# Patient Record
Sex: FEMALE | Race: WHITE | URBAN - METROPOLITAN AREA
[De-identification: names, ages, dates, MRNs, and addresses within clinical notes are randomized per-mention and may not be internally consistent; named-entity substitution may affect disease eponyms.]

---

## 2017-08-09 ENCOUNTER — TRANSFERRED RECORDS (OUTPATIENT)
Dept: HEALTH INFORMATION MANAGEMENT | Facility: CLINIC | Age: 47
End: 2017-08-09

## 2017-08-15 ENCOUNTER — TRANSFERRED RECORDS (OUTPATIENT)
Dept: HEALTH INFORMATION MANAGEMENT | Facility: CLINIC | Age: 47
End: 2017-08-15

## 2017-12-13 ENCOUNTER — TRANSFERRED RECORDS (OUTPATIENT)
Dept: HEALTH INFORMATION MANAGEMENT | Facility: CLINIC | Age: 47
End: 2017-12-13

## 2017-12-15 ENCOUNTER — TRANSFERRED RECORDS (OUTPATIENT)
Dept: HEALTH INFORMATION MANAGEMENT | Facility: CLINIC | Age: 47
End: 2017-12-15

## 2017-12-21 ENCOUNTER — HOSPITAL ENCOUNTER (OUTPATIENT)
Facility: CLINIC | Age: 47
Setting detail: OBSERVATION
Discharge: HOME | End: 2017-12-22
Attending: FAMILY MEDICINE | Admitting: FAMILY MEDICINE
Payer: COMMERCIAL

## 2017-12-21 ENCOUNTER — APPOINTMENT (OUTPATIENT)
Dept: GENERAL RADIOLOGY | Facility: CLINIC | Age: 47
End: 2017-12-21
Attending: FAMILY MEDICINE
Payer: COMMERCIAL

## 2017-12-21 ENCOUNTER — OFFICE VISIT (OUTPATIENT)
Dept: ALLERGY | Facility: CLINIC | Age: 47
End: 2017-12-21
Payer: COMMERCIAL

## 2017-12-21 VITALS
RESPIRATION RATE: 28 BRPM | SYSTOLIC BLOOD PRESSURE: 141 MMHG | TEMPERATURE: 96.8 F | HEART RATE: 72 BPM | WEIGHT: 132.5 LBS | HEIGHT: 62 IN | BODY MASS INDEX: 24.38 KG/M2 | OXYGEN SATURATION: 100 % | DIASTOLIC BLOOD PRESSURE: 80 MMHG

## 2017-12-21 DIAGNOSIS — Z87.09 HISTORY OF ASTHMA: ICD-10-CM

## 2017-12-21 DIAGNOSIS — J30.0 CHRONIC VASOMOTOR RHINITIS: ICD-10-CM

## 2017-12-21 DIAGNOSIS — R06.02 SHORTNESS OF BREATH: ICD-10-CM

## 2017-12-21 DIAGNOSIS — R06.09 DOE (DYSPNEA ON EXERTION): ICD-10-CM

## 2017-12-21 DIAGNOSIS — R49.0 MUSCLE TENSION DYSPHONIA: ICD-10-CM

## 2017-12-21 DIAGNOSIS — R06.00 DYSPNEA, UNSPECIFIED TYPE: Primary | ICD-10-CM

## 2017-12-21 LAB
ALBUMIN SERPL-MCNC: 4 G/DL (ref 3.4–5)
ALBUMIN SERPL-MCNC: 4.2 G/DL (ref 3.4–5)
ALP SERPL-CCNC: 61 U/L (ref 40–150)
ALP SERPL-CCNC: 69 U/L (ref 40–150)
ALT SERPL W P-5'-P-CCNC: 24 U/L (ref 0–50)
ALT SERPL W P-5'-P-CCNC: 26 U/L (ref 0–50)
ANION GAP SERPL CALCULATED.3IONS-SCNC: 6 MMOL/L (ref 3–14)
ANION GAP SERPL CALCULATED.3IONS-SCNC: 8 MMOL/L (ref 3–14)
APTT PPP: 25 SEC (ref 22–37)
AST SERPL W P-5'-P-CCNC: 13 U/L (ref 0–45)
AST SERPL W P-5'-P-CCNC: 17 U/L (ref 0–45)
BASOPHILS # BLD AUTO: 0 10E9/L (ref 0–0.2)
BASOPHILS # BLD AUTO: 0 10E9/L (ref 0–0.2)
BASOPHILS NFR BLD AUTO: 0.1 %
BASOPHILS NFR BLD AUTO: 0.2 %
BILIRUB SERPL-MCNC: 0.6 MG/DL (ref 0.2–1.3)
BILIRUB SERPL-MCNC: 0.6 MG/DL (ref 0.2–1.3)
BUN SERPL-MCNC: 10 MG/DL (ref 7–30)
BUN SERPL-MCNC: 11 MG/DL (ref 7–30)
CALCIUM SERPL-MCNC: 9.1 MG/DL (ref 8.5–10.1)
CALCIUM SERPL-MCNC: 9.5 MG/DL (ref 8.5–10.1)
CHLORIDE SERPL-SCNC: 102 MMOL/L (ref 94–109)
CHLORIDE SERPL-SCNC: 105 MMOL/L (ref 94–109)
CO2 SERPL-SCNC: 26 MMOL/L (ref 20–32)
CO2 SERPL-SCNC: 28 MMOL/L (ref 20–32)
CREAT SERPL-MCNC: 0.79 MG/DL (ref 0.52–1.04)
CREAT SERPL-MCNC: 0.82 MG/DL (ref 0.52–1.04)
DIFFERENTIAL METHOD BLD: NORMAL
DIFFERENTIAL METHOD BLD: NORMAL
EOSINOPHIL # BLD AUTO: 0 10E9/L (ref 0–0.7)
EOSINOPHIL # BLD AUTO: 0 10E9/L (ref 0–0.7)
EOSINOPHIL NFR BLD AUTO: 0 %
EOSINOPHIL NFR BLD AUTO: 0.2 %
ERYTHROCYTE [DISTWIDTH] IN BLOOD BY AUTOMATED COUNT: 12.7 % (ref 10–15)
ERYTHROCYTE [DISTWIDTH] IN BLOOD BY AUTOMATED COUNT: 13 % (ref 10–15)
FEF 25/75: NORMAL
FEV-1: NORMAL
FEV1/FVC: NORMAL
FVC: NORMAL
GFR SERPL CREATININE-BSD FRML MDRD: 76 ML/MIN/1.7M2
GFR SERPL CREATININE-BSD FRML MDRD: 78 ML/MIN/1.7M2
GLUCOSE SERPL-MCNC: 107 MG/DL (ref 70–99)
GLUCOSE SERPL-MCNC: 114 MG/DL (ref 70–99)
HCT VFR BLD AUTO: 45.7 % (ref 35–47)
HCT VFR BLD AUTO: 45.9 % (ref 35–47)
HGB BLD-MCNC: 15.1 G/DL (ref 11.7–15.7)
HGB BLD-MCNC: 15.7 G/DL (ref 11.7–15.7)
IMM GRANULOCYTES # BLD: 0 10E9/L (ref 0–0.4)
IMM GRANULOCYTES NFR BLD: 0.4 %
INR PPP: 1.07 (ref 0.86–1.14)
LYMPHOCYTES # BLD AUTO: 1.5 10E9/L (ref 0.8–5.3)
LYMPHOCYTES # BLD AUTO: 1.7 10E9/L (ref 0.8–5.3)
LYMPHOCYTES NFR BLD AUTO: 14.6 %
LYMPHOCYTES NFR BLD AUTO: 16.7 %
MCH RBC QN AUTO: 31.3 PG (ref 26.5–33)
MCH RBC QN AUTO: 31.3 PG (ref 26.5–33)
MCHC RBC AUTO-ENTMCNC: 33 G/DL (ref 31.5–36.5)
MCHC RBC AUTO-ENTMCNC: 34.2 G/DL (ref 31.5–36.5)
MCV RBC AUTO: 91 FL (ref 78–100)
MCV RBC AUTO: 95 FL (ref 78–100)
MONOCYTES # BLD AUTO: 0.3 10E9/L (ref 0–1.3)
MONOCYTES # BLD AUTO: 0.3 10E9/L (ref 0–1.3)
MONOCYTES NFR BLD AUTO: 2.6 %
MONOCYTES NFR BLD AUTO: 2.7 %
NEUTROPHILS # BLD AUTO: 8.2 10E9/L (ref 1.6–8.3)
NEUTROPHILS # BLD AUTO: 8.3 10E9/L (ref 1.6–8.3)
NEUTROPHILS NFR BLD AUTO: 80.1 %
NEUTROPHILS NFR BLD AUTO: 82.4 %
NRBC # BLD AUTO: 0 10*3/UL
NRBC BLD AUTO-RTO: 0 /100
NT-PROBNP SERPL-MCNC: 35 PG/ML (ref 0–450)
PLATELET # BLD AUTO: 232 10E9/L (ref 150–450)
PLATELET # BLD AUTO: 270 10E9/L (ref 150–450)
POTASSIUM SERPL-SCNC: 3.3 MMOL/L (ref 3.4–5.3)
POTASSIUM SERPL-SCNC: 3.4 MMOL/L (ref 3.4–5.3)
PROT SERPL-MCNC: 7.8 G/DL (ref 6.8–8.8)
PROT SERPL-MCNC: 8.4 G/DL (ref 6.8–8.8)
RBC # BLD AUTO: 4.82 10E12/L (ref 3.8–5.2)
RBC # BLD AUTO: 5.02 10E12/L (ref 3.8–5.2)
SODIUM SERPL-SCNC: 136 MMOL/L (ref 133–144)
SODIUM SERPL-SCNC: 140 MMOL/L (ref 133–144)
TROPONIN I SERPL-MCNC: <0.015 UG/L (ref 0–0.04)
WBC # BLD AUTO: 10 10E9/L (ref 4–11)
WBC # BLD AUTO: 10.2 10E9/L (ref 4–11)

## 2017-12-21 PROCEDURE — 99000 SPECIMEN HANDLING OFFICE-LAB: CPT | Performed by: ALLERGY & IMMUNOLOGY

## 2017-12-21 PROCEDURE — 80053 COMPREHEN METABOLIC PANEL: CPT | Performed by: FAMILY MEDICINE

## 2017-12-21 PROCEDURE — 85025 COMPLETE CBC W/AUTO DIFF WBC: CPT | Performed by: ALLERGY & IMMUNOLOGY

## 2017-12-21 PROCEDURE — 93005 ELECTROCARDIOGRAM TRACING: CPT | Performed by: FAMILY MEDICINE

## 2017-12-21 PROCEDURE — 85025 COMPLETE CBC W/AUTO DIFF WBC: CPT | Performed by: FAMILY MEDICINE

## 2017-12-21 PROCEDURE — 25000125 ZZHC RX 250

## 2017-12-21 PROCEDURE — 71010 XR CHEST PORT 1 VW: CPT

## 2017-12-21 PROCEDURE — 80053 COMPREHEN METABOLIC PANEL: CPT | Performed by: ALLERGY & IMMUNOLOGY

## 2017-12-21 PROCEDURE — 25000128 H RX IP 250 OP 636: Performed by: FAMILY MEDICINE

## 2017-12-21 PROCEDURE — 99204 OFFICE O/P NEW MOD 45 MIN: CPT | Mod: 25 | Performed by: ALLERGY & IMMUNOLOGY

## 2017-12-21 PROCEDURE — 83880 ASSAY OF NATRIURETIC PEPTIDE: CPT | Performed by: FAMILY MEDICINE

## 2017-12-21 PROCEDURE — 85610 PROTHROMBIN TIME: CPT | Performed by: FAMILY MEDICINE

## 2017-12-21 PROCEDURE — G0378 HOSPITAL OBSERVATION PER HR: HCPCS

## 2017-12-21 PROCEDURE — 36415 COLL VENOUS BLD VENIPUNCTURE: CPT | Performed by: ALLERGY & IMMUNOLOGY

## 2017-12-21 PROCEDURE — 85730 THROMBOPLASTIN TIME PARTIAL: CPT | Performed by: FAMILY MEDICINE

## 2017-12-21 PROCEDURE — 99285 EMERGENCY DEPT VISIT HI MDM: CPT | Mod: 25 | Performed by: FAMILY MEDICINE

## 2017-12-21 PROCEDURE — 93010 ELECTROCARDIOGRAM REPORT: CPT | Mod: Z6 | Performed by: FAMILY MEDICINE

## 2017-12-21 PROCEDURE — 83520 IMMUNOASSAY QUANT NOS NONAB: CPT | Mod: 90 | Performed by: ALLERGY & IMMUNOLOGY

## 2017-12-21 PROCEDURE — 31231 NASAL ENDOSCOPY DX: CPT

## 2017-12-21 PROCEDURE — 94060 EVALUATION OF WHEEZING: CPT | Performed by: ALLERGY & IMMUNOLOGY

## 2017-12-21 PROCEDURE — 99220 ZZC INITIAL OBSERVATION CARE,LEVL III: CPT | Mod: 25 | Performed by: NURSE PRACTITIONER

## 2017-12-21 PROCEDURE — 84484 ASSAY OF TROPONIN QUANT: CPT | Performed by: FAMILY MEDICINE

## 2017-12-21 RX ORDER — ACETAMINOPHEN 325 MG/1
650 TABLET ORAL EVERY 4 HOURS PRN
Status: DISCONTINUED | OUTPATIENT
Start: 2017-12-21 | End: 2017-12-22 | Stop reason: HOSPADM

## 2017-12-21 RX ORDER — ONDANSETRON 2 MG/ML
4 INJECTION INTRAMUSCULAR; INTRAVENOUS EVERY 6 HOURS PRN
Status: DISCONTINUED | OUTPATIENT
Start: 2017-12-21 | End: 2017-12-22 | Stop reason: HOSPADM

## 2017-12-21 RX ORDER — NALOXONE HYDROCHLORIDE 0.4 MG/ML
.1-.4 INJECTION, SOLUTION INTRAMUSCULAR; INTRAVENOUS; SUBCUTANEOUS
Status: DISCONTINUED | OUTPATIENT
Start: 2017-12-21 | End: 2017-12-22 | Stop reason: HOSPADM

## 2017-12-21 RX ORDER — LORATADINE 10 MG/1
10 TABLET ORAL DAILY
Status: DISCONTINUED | OUTPATIENT
Start: 2017-12-22 | End: 2017-12-22 | Stop reason: HOSPADM

## 2017-12-21 RX ORDER — ONDANSETRON 4 MG/1
4 TABLET, ORALLY DISINTEGRATING ORAL EVERY 6 HOURS PRN
Status: DISCONTINUED | OUTPATIENT
Start: 2017-12-21 | End: 2017-12-22 | Stop reason: HOSPADM

## 2017-12-21 RX ORDER — DIPHENHYDRAMINE HCL 25 MG
25-50 TABLET ORAL
COMMUNITY
Start: 2017-12-15

## 2017-12-21 RX ORDER — LIDOCAINE 40 MG/G
CREAM TOPICAL
Status: DISCONTINUED | OUTPATIENT
Start: 2017-12-21 | End: 2017-12-22 | Stop reason: HOSPADM

## 2017-12-21 RX ORDER — SODIUM CHLORIDE 9 MG/ML
1000 INJECTION, SOLUTION INTRAVENOUS CONTINUOUS
Status: DISCONTINUED | OUTPATIENT
Start: 2017-12-21 | End: 2017-12-21

## 2017-12-21 RX ORDER — LIDOCAINE HYDROCHLORIDE 20 MG/ML
INJECTION, SOLUTION EPIDURAL; INFILTRATION; INTRACAUDAL; PERINEURAL
Status: COMPLETED
Start: 2017-12-21 | End: 2017-12-21

## 2017-12-21 RX ORDER — EPINEPHRINE 0.3 MG/.3ML
0.3 INJECTION SUBCUTANEOUS
COMMUNITY
Start: 2017-12-13

## 2017-12-21 RX ORDER — ACETAMINOPHEN 325 MG/1
650 TABLET ORAL ONCE
Status: DISCONTINUED | OUTPATIENT
Start: 2017-12-21 | End: 2017-12-22 | Stop reason: HOSPADM

## 2017-12-21 RX ADMIN — SODIUM CHLORIDE 1000 ML: 9 INJECTION, SOLUTION INTRAVENOUS at 13:17

## 2017-12-21 RX ADMIN — LIDOCAINE HYDROCHLORIDE: 20 JELLY TOPICAL at 17:04

## 2017-12-21 RX ADMIN — LIDOCAINE HYDROCHLORIDE: 20 INJECTION, SOLUTION EPIDURAL; INFILTRATION; INTRACAUDAL; PERINEURAL at 17:03

## 2017-12-21 ASSESSMENT — ENCOUNTER SYMPTOMS
FEVER: 0
CHEST TIGHTNESS: 1
NECK STIFFNESS: 0
LIGHT-HEADEDNESS: 1
NAUSEA: 1
ACTIVITY CHANGE: 0
WEAKNESS: 0
EYE DISCHARGE: 1
FACIAL SWELLING: 0
CHILLS: 0
BRUISES/BLEEDS EASILY: 0
SHORTNESS OF BREATH: 1
ARTHRALGIAS: 0
DIZZINESS: 1
SEIZURES: 0
HEADACHES: 0
WHEEZING: 0
ADENOPATHY: 0
DIFFICULTY URINATING: 0
HEADACHES: 1
CHEST TIGHTNESS: 1
SINUS PRESSURE: 1
DIARRHEA: 0
NAUSEA: 1
ARTHRALGIAS: 0
NERVOUS/ANXIOUS: 1
WHEEZING: 1
COUGH: 1
FEVER: 0
EYE REDNESS: 0
FATIGUE: 1
DIZZINESS: 1
MYALGIAS: 0
EYE REDNESS: 1
EYE ITCHING: 1
SHORTNESS OF BREATH: 1
CONFUSION: 0
JOINT SWELLING: 0
ACTIVITY CHANGE: 1
LIGHT-HEADEDNESS: 1
ABDOMINAL PAIN: 0
APPETITE CHANGE: 1
RHINORRHEA: 1
SPEECH DIFFICULTY: 1
FATIGUE: 1
COLOR CHANGE: 0
VOMITING: 0
VOICE CHANGE: 1

## 2017-12-21 NOTE — ED PROVIDER NOTES
History     Chief Complaint   Patient presents with     Shortness of Breath     HPI  Corina Alvarenga is a 47 year old female with history of asthma presents to the ED today from Allergy clinic with shortness of breath.  Per review of [facility]'s Care Everywhere, patient was in the ED from 12/13-12/15 for wheezing, weakness, and raspiness of voice after exposure to pine branches.  She was treated with Solu-Medrol, Benadryl, and Pepcid.  Her symptoms improved.  However, she had returned to the ED because her throat symptoms returned again was treated with subcutaneous epinephrine.  She had a bedside cardiac ultrasound performed on 12/15 was negative for pericardial effusion.  She also had a CT angiogram of the chest which was negative for PE.  Per her allergy clinic note today, she did have a positive allergic reaction to birch, pollen, and maple trees in 8/2017 among 1 weed and 2 molds.  Patient today has complaints of shortness of breath, chest tightness, wheezing, lightheadedness, dizziness, voice changes, nausea, and loss of appetite which started again last night.  She also states that she feels exhausted whenever she walks short distances. She states she took her albuterol inhaler with no improvement of her symptoms and states that her symptoms feel different from her asthma.  She does state that recently her allergies have been worse lately.  She also states she tried a DuoNeb treatment and nearly passed out from this.   She is so currently taking Claritin, Nasacort, and prednisone for symptoms with no relief.  She denies currently denies any history of recent travel, previous history of heart problems, or prior history of DVTs or PEs .    PAST MEDICAL HISTORY  Past Medical History:   Diagnosis Date     Uncomplicated asthma      PAST SURGICAL HISTORY  Past Surgical History:   Procedure Laterality Date     HYSTERECTOMY  12/2001     FAMILY HISTORY  Family History   Problem Relation Age of Onset     HEART  DISEASE Father      CANCER Father      Hypertension Maternal Grandmother      HEART DISEASE Maternal Grandmother      Hypertension Maternal Grandfather      HEART DISEASE Maternal Grandfather      Hypertension Paternal Grandmother      HEART DISEASE Paternal Grandmother      Asthma Daughter      SOCIAL HISTORY  Social History   Substance Use Topics     Smoking status: Never Smoker     Smokeless tobacco: Never Used     Alcohol use No     MEDICATIONS  Current Facility-Administered Medications   Medication     lidocaine 1 % 1 mL     lidocaine (LMX4) kit     sodium chloride (PF) 0.9% PF flush 3 mL     sodium chloride (PF) 0.9% PF flush 3 mL     0.9% sodium chloride infusion     acetaminophen (TYLENOL) tablet 650 mg     Current Outpatient Prescriptions   Medication     Loratadine (CLARITIN PO)     Triamcinolone Acetonide (NASACORT AQ NA)     ALBUTEROL IN     KETOTIFEN FUMARATE OP     estrogens, conjugated, (PREMARIN) 1.25 MG tablet     EPINEPHrine (EPIPEN/ADRENACLICK/OR ANY BX GENERIC EQUIV) 0.3 MG/0.3ML injection 2-pack     diphenhydrAMINE (BENADRYL) 25 MG tablet     ALLERGIES  Allergies   Allergen Reactions     Codeine      Midrin [Isometheptene-Apap-Dichloral]      Morphine      Seasonal Allergies      Trees     Talwin [Pentazocine]          I have reviewed the Medications, Allergies, Past Medical and Surgical History, and Social History in the Epic system.    Review of Systems   Constitutional: Positive for activity change, appetite change (loss of appetite) and fatigue. Negative for fever.        Patient notes increasing fatigability and dyspnea with speech also.   HENT: Positive for voice change (quiet raspy). Negative for congestion.    Eyes: Negative for redness.   Respiratory: Positive for chest tightness, shortness of breath and wheezing.    Cardiovascular: Negative for chest pain.   Gastrointestinal: Positive for nausea. Negative for abdominal pain.   Genitourinary: Negative for difficulty urinating.    Musculoskeletal: Negative for arthralgias and neck stiffness.   Skin: Negative for color change.   Neurological: Positive for dizziness, speech difficulty and light-headedness. Negative for seizures, weakness and headaches.   Hematological: Does not bruise/bleed easily.   Psychiatric/Behavioral: Negative for confusion. The patient is nervous/anxious.         Patient does well at night sleeping   All other systems reviewed and are negative.      Physical Exam   BP: 130/89  Heart Rate: 64  Temp: 97.6  F (36.4  C)  Resp: 20  Weight: 60.1 kg (132 lb 7.9 oz)  SpO2: 100 %      Physical Exam   Constitutional: She is oriented to person, place, and time. She appears well-developed and well-nourished. She appears distressed.   Patient soft-spoken but not stridorous.  Patient is not drooling able speak full sentences but seems mildly dyspneic anxious   HENT:   Head: Normocephalic and atraumatic.   Eyes: Conjunctivae and EOM are normal. Pupils are equal, round, and reactive to light. No scleral icterus.   Neck: Normal range of motion. Neck supple. No JVD present. No tracheal deviation present.   Cardiovascular: Normal rate and regular rhythm.    Pulmonary/Chest: No stridor. No respiratory distress. She has no wheezes. She has no rales. She exhibits no tenderness.   Breath sounds are clear.  No wheezing noted no stridor   Abdominal: She exhibits no distension. There is no tenderness. There is no rebound.   Musculoskeletal: She exhibits no edema, tenderness or deformity.   No edema negative Homans   Neurological: She is alert and oriented to person, place, and time. She has normal reflexes. No cranial nerve deficit. Coordination normal.   Skin: Skin is warm and dry. No rash noted. She is not diaphoretic. No erythema. No pallor.   Psychiatric:   Mildly anxious otherwise appropriate   Nursing note and vitals reviewed.      ED Course     ED Course     Procedures   12:24 PM  The patient was seen and examined by Dr. Waddell in Room  20.   Patient evaluated in the ER.  EKG shows sinus bradycardia with nonspecific ST changes seen  Chest x-ray done revealing no pneumothorax effusion infiltrate etc.  Laboratory testing troponin and BNP negative.  Chemistries normal.  CBC normal hemoglobin 15.1.    The ER patient been stable.    Patient evaluated in the ER with 1 L normal saline IV bolus    ENT was consulted who did evaluate the patient and did fiberoptic laryngoscopy.  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  Recommendations are for speech therapy.  Discussed with patient she feels reassured still somewhat anxious with her fatigability just going to the bathroom the ER.  Also concern of time.  It will take to get her set up with speech therapy.  Patient lives by herself she is from [place].  Will admit overnight to monitor oximetry along with this if any wheezing etc. can treat with nebulization treatment.  Also will consult speech therapy to see in the morning for further evaluation and then recommendation for treatment plan etc.  Patient agrees with plan and will be admitted to the ED observation overnight.                 EKG Interpretation:      Interpreted by Aaron Waddell  Time reviewed: 1240  Symptoms at time of EKG: sob mendosa   Rhythm: sinus yo  Rate: 54  Axis: normal  Ectopy: none  Conduction: normal  ST Segments/ T Waves: Nonspecific ST-T wave flatening changes  Q Waves: none  Comparison to prior: No old EKG available    Clinical Impression: sinus yo with nonspecific st changes            Critical Care time:  none           Labs Ordered and Resulted from Time of ED Arrival Up to the Time of Departure from the ED   COMPREHENSIVE METABOLIC PANEL - Abnormal; Notable for the following:        Result Value    Glucose 107 (*)     All other components within normal limits   CBC WITH PLATELETS DIFFERENTIAL   INR   PARTIAL THROMBOPLASTIN TIME   TROPONIN I   NT PROBNP INPATIENT   PULSE OXIMETRY NURSING   PERIPHERAL IV  CATHETER   CARDIAC CONTINUOUS MONITORING     Results for orders placed or performed during the hospital encounter of 12/21/17   XR Chest Port 1 View    Narrative    EXAM: XR CHEST PORT 1 VW  12/21/2017 1:16 PM     HISTORY:  sob;        COMPARISON: None    FINDINGS: Single portable upright AP view of chest. Cardiac silhouette  is within normal limits. No pneumothorax. No pleural effusion. No  focal airspace opacity. Upper abdomen is unremarkable.      Impression    IMPRESSION: No acute cardiopulmonary findings. Clear lungs.    I have personally reviewed the examination and initial interpretation  and I agree with the findings.    MARY OCAMPO MD   CBC with platelets differential   Result Value Ref Range    WBC 10.2 4.0 - 11.0 10e9/L    RBC Count 4.82 3.8 - 5.2 10e12/L    Hemoglobin 15.1 11.7 - 15.7 g/dL    Hematocrit 45.7 35.0 - 47.0 %    MCV 95 78 - 100 fl    MCH 31.3 26.5 - 33.0 pg    MCHC 33.0 31.5 - 36.5 g/dL    RDW 13.0 10.0 - 15.0 %    Platelet Count 232 150 - 450 10e9/L    Diff Method Automated Method     % Neutrophils 80.1 %    % Lymphocytes 16.7 %    % Monocytes 2.6 %    % Eosinophils 0.0 %    % Basophils 0.2 %    % Immature Granulocytes 0.4 %    Nucleated RBCs 0 0 /100    Absolute Neutrophil 8.2 1.6 - 8.3 10e9/L    Absolute Lymphocytes 1.7 0.8 - 5.3 10e9/L    Absolute Monocytes 0.3 0.0 - 1.3 10e9/L    Absolute Eosinophils 0.0 0.0 - 0.7 10e9/L    Absolute Basophils 0.0 0.0 - 0.2 10e9/L    Abs Immature Granulocytes 0.0 0 - 0.4 10e9/L    Absolute Nucleated RBC 0.0    INR   Result Value Ref Range    INR 1.07 0.86 - 1.14   Partial thromboplastin time   Result Value Ref Range    PTT 25 22 - 37 sec   Comprehensive metabolic panel   Result Value Ref Range    Sodium 140 133 - 144 mmol/L    Potassium 3.4 3.4 - 5.3 mmol/L    Chloride 105 94 - 109 mmol/L    Carbon Dioxide 28 20 - 32 mmol/L    Anion Gap 6 3 - 14 mmol/L    Glucose 107 (H) 70 - 99 mg/dL    Urea Nitrogen 10 7 - 30 mg/dL    Creatinine 0.79 0.52 - 1.04  mg/dL    GFR Estimate 78 >60 mL/min/1.7m2    GFR Estimate If Black >90 >60 mL/min/1.7m2    Calcium 9.1 8.5 - 10.1 mg/dL    Bilirubin Total 0.6 0.2 - 1.3 mg/dL    Albumin 4.0 3.4 - 5.0 g/dL    Protein Total 7.8 6.8 - 8.8 g/dL    Alkaline Phosphatase 61 40 - 150 U/L    ALT 26 0 - 50 U/L    AST 13 0 - 45 U/L   Troponin I   Result Value Ref Range    Troponin I ES <0.015 0.000 - 0.045 ug/L   Nt probnp inpatient (BNP)   Result Value Ref Range    N-Terminal Pro BNP Inpatient 35 0 - 450 pg/mL   EKG 12-lead, tracing only   Result Value Ref Range    Interpretation ECG Click View Image link to view waveform and result               Assessments & Plan (with Medical Decision Making)  47-year-old female presents to ER with shortness of breath.  She seen by allergist earlier today.  Patient a week ago in [place] was treated twice for questionable anaphylaxis have been on steroids etc.  Had extensive workup including a negative CT of the chest echocardiogram etc.  Still wondering if this is allergen trigger.  Patient was sent with the ongoing dyspnea with exertion on her allergy clinic to the ER.  Patient upon arrival vital signs been stable her EKG showed some nonspecific ST flattening but her troponin and BNP were normal.  Labs normal chest x-ray normal.  See more reflective with a somewhat soft spoken voice and more upper throat discomfort.  She was seen by ENT in the ER with fiberoptic laryngoscopy with normal findings with also findings suggestive of muscle tension dysphonia.  Recommend patient's per ENT were for speech therapy.  Patient because she lives alone is somewhat nervous and still somewhat short of breath activity is in agreement we will admit to ED observation overnight.  We will plan for speech therapy consultation the morning monitoring through the night for any other abnormalities.         I have reviewed the nursing notes.    I have reviewed the findings, diagnosis, plan and need for follow up with the  patient.    New Prescriptions    No medications on file       Final diagnoses:   Muscle tension dysphonia   SANDOVAL (dyspnea on exertion)     IBoaz, am serving as a trained medical scribe to document services personally performed by Aaron Waddell MD, based on the provider's statements to me.      Aaron DE LA ROSA MD, was physically present and have reviewed and verified the accuracy of this note documented by Boaz Millan.     12/21/2017   [facility], EMERGENCY DEPARTMENT    This note was created at least in part by the use of dragon voice dictation system. Inadvertent typographical errors may still exist.  Aaron Waddell MD.         Aaron Waddell MD  12/21/17 1231

## 2017-12-21 NOTE — LETTER
12/21/2017         RE: Corina Alvarenga  [address]  [facility] [address]        Dear Colleague,    Thank you for referring your patient, Corina Alvarenga, to the [facility]. Please see a copy of my visit note below.    SUBJECTIVE:                                                                   Corina Alvarenga presents today to our Allergy Clinic at [facility] for a new patient visit.    She is a 47-year-old female with a historical diagnosis of asthma and environmental allergies.    The patient states that she had asthma for about 10-15 years, manifested by wheezing, chest tightness and shortness of breath.  In the past, her symptoms were URI induced and where acutely responsive to albuterol.  If symptoms persisted, she would take Advair, and in a short period of time, she would get better.  She usually does not have any problem on exertion.  She has a history of nasal congestion, rhinorrhea, postnasal drainage, sneezing and sinus pressure, but her symptoms are not as bad as her current chest symptoms.  She initially was tested several years ago in [facility].  According to her, the testing was negative.  She was repeatedly tested in August 2017 in [place], by Isiah Brown.  On my review of the results, percutaneous skin puncture testing with borderline sensitivity to the pollen of 2 trees (Birch, pine, and maple), 1 weed (cocklebur, and 2 molds (Alternaria and Aspergillus).  She also had intradermal testing with borderline sensitivity to grass pollen, molds, and dust mite.  She has been using Nasacort, and her nasal symptoms are relatively well controlled.  She takes Claritin daily as well.    The patient states that her main triggers are strong scents and odors, like detergent, Pine-Sol, perfume or cigarette smoke, that usually triggers dyspnea and raspy voice.  These symptoms became worse for the last several months.  8 days ago, the patient  was at a fitness center and came in the close contact with pine branches.  She developed within minutes throat fullness, raspy voice, wheezing, and fatigue.  She was seen in ED and treated with Solu-Medrol 100 mg, Benadryl, and Pepcid.  After she improved, she was discharged home.  She felt tired, but once she got home several hours later, she developed throat symptoms again.  She was treated with more steroids and given epinephrine that she did not tolerate well.   On my review of her vital signs posttreatment, they were all within normal limits.  The patient states that she was hospitalized for several days, and while she continued complaining of shortness of breath, dizziness, coughing and throat clearing, she was discharged home.  She had CTA that was negative.  The patient states that she was discharged home with prednisone.  She completed her last dose today.  Since her hospitalization, her shortness of breath has not improved at all.  She also complains of tingling sensation in her extremities.  She feels very fatigued and dizzy.      There is no problem list on file for this patient.      Past Medical History:   Diagnosis Date     Uncomplicated asthma       Problem (# of Occurrences) Relation (Name,Age of Onset)    Asthma (1) Daughter    CANCER (1) Father    HEART DISEASE (4) Father, Maternal Grandmother, Maternal Grandfather, Paternal Grandmother    Hypertension (3) Maternal Grandmother, Maternal Grandfather, Paternal Grandmother        Past Surgical History:   Procedure Laterality Date     HYSTERECTOMY  12/2001     Social History     Social History     Marital status: Single     Spouse name: N/A     Number of children: N/A     Years of education: N/A     Occupational History     RN      Social History Main Topics     Smoking status: Never Smoker     Smokeless tobacco: Never Used     Alcohol use No     Drug use: No     Sexual activity: Not Asked     Other Topics Concern     None     Social History Narrative     December 21, 2017    ENVIRONMENTAL HISTORY: The family lives in about a 10 year old [place]. The home is heated with a forced air. They do have central air conditioning. The patient's bedroom is furnished with feather/wool bedding or pillows, carpeting in bedroom and fabric window coverings. No pets inside the house. There is no history of cockroach or mice infestation. There are no smokers in the house.  The house does not have a damp basement.                Review of Systems   Constitutional: Positive for fatigue. Negative for activity change, chills and fever.   HENT: Positive for congestion, postnasal drip, rhinorrhea, sinus pressure and sneezing. Negative for dental problem, ear pain, facial swelling and nosebleeds.    Eyes: Positive for discharge, redness and itching.   Respiratory: Positive for cough, chest tightness and shortness of breath. Negative for wheezing.    Cardiovascular: Positive for chest pain.   Gastrointestinal: Positive for nausea. Negative for diarrhea and vomiting.   Musculoskeletal: Negative for arthralgias, joint swelling and myalgias.   Skin: Negative for rash.   Neurological: Positive for dizziness, light-headedness and headaches.   Hematological: Negative for adenopathy.   Psychiatric/Behavioral: Negative for behavioral problems and self-injury.         No current facility-administered medications for this visit.     Current Outpatient Prescriptions:      Loratadine (CLARITIN PO), , Disp: , Rfl:      Triamcinolone Acetonide (NASACORT AQ NA), , Disp: , Rfl:      ALBUTEROL IN, , Disp: , Rfl:      KETOTIFEN FUMARATE OP, , Disp: , Rfl:      estrogens, conjugated, (PREMARIN) 1.25 MG tablet, Take 1.25 mg by mouth, Disp: , Rfl:      EPINEPHrine (EPIPEN/ADRENACLICK/OR ANY BX GENERIC EQUIV) 0.3 MG/0.3ML injection 2-pack, Inject 0.3 mg into the muscle, Disp: , Rfl:      diphenhydrAMINE (BENADRYL) 25 MG tablet, Take 25-50 mg by mouth, Disp: , Rfl:     Facility-Administered  "Medications Ordered in Other Visits:      lidocaine 1 % 1 mL, 1 mL, Other, Q1H PRN, Aaron Waddell MD     lidocaine (LMX4) kit, , Topical, Q1H PRN, Aaron Waddell MD     sodium chloride (PF) 0.9% PF flush 3 mL, 3 mL, Intracatheter, Q1H PRN, Aaron Waddell MD     sodium chloride (PF) 0.9% PF flush 3 mL, 3 mL, Intracatheter, Q8H, Aaron Waddell MD     0.9% sodium chloride BOLUS, 1,000 mL, Intravenous, Once, Last Rate: 1,000 mL/hr at 12/21/17 1317, 1,000 mL at 12/21/17 1317 **FOLLOWED BY** 0.9% sodium chloride infusion, 1,000 mL, Intravenous, Continuous, Aaron Waddell MD    There is no immunization history on file for this patient.  Allergies   Allergen Reactions     Codeine      Midrin [Isometheptene-Apap-Dichloral]      Morphine      Seasonal Allergies      Trees     Talwin [Pentazocine]      OBJECTIVE:                                                                 /80 (BP Location: Right arm, Patient Position: Left side, Cuff Size: Adult Regular)  Pulse 72  Temp 96.8  F (36  C) (Oral)  Resp 28  Ht 1.565 m (5' 1.61\")  Wt 60.1 kg (132 lb 7.9 oz)  SpO2 100%  BMI 24.54 kg/m2        Physical Exam   Constitutional: She appears distressed (tearful, winded).   HENT:   Head: Normocephalic and atraumatic.   Right Ear: Tympanic membrane, external ear and ear canal normal.   Left Ear: Tympanic membrane, external ear and ear canal normal.   Nose: Mucosal edema (mild) present.   Mouth/Throat: Oropharynx is clear and moist. No oropharyngeal exudate, posterior oropharyngeal edema or posterior oropharyngeal erythema.   Very raspy voice. Whispering.   Eyes: Conjunctivae are normal. Right eye exhibits no discharge. Left eye exhibits no discharge.   Neck: Neck supple.   Cardiovascular: Normal rate and regular rhythm.    Pulmonary/Chest: She has no wheezes. She has no rales.   Shallow breath, but when asked to take a deep breath in, the patient has a good air entry.   Musculoskeletal: Normal range " of motion. She exhibits no edema.   Neurological: She is alert.   Skin: She is not diaphoretic.   Psychiatric: Affect normal.   Nursing note and vitals reviewed.      SPIROMETRY       FVC 2.68L (82% of predicted).     FEV1 2.17L (82% of predicted).     FEV1/FVC 81%     FEF 25%-75%  2.09L/s (75% of predicted).      The office spirometry performed today with a poor effort, but FVC and FEV1 do not suggest an obstruction.  The patient became lightheaded and had near syncopal event after albuterol neb.      ASSESSMENT/PLAN:         Visit Diagnoses     1. Dyspnea, unspecified type    -  Primary  She developed mild blotchiness in the upper chest area, while getting the albuterol neb.  No true urticaria.  The rest of the skin she has some freckles. Negative for urticaria or angioedema. Negative  Darier's sign.  Vital signs within normal limits.  No wheezing on exam.  The patient complained of dizziness, increased tingling sensation in her extremities.  Symptoms improved after lying down for 30 minutes.    She still seems to be disproportionately short of breath.    I am not quite sure whether the patient truly had anaphylaxis considering that she has been having her symptoms for the last week and besides her sensations of shortness of breath, dizziness, tingling sensation in her extremities, objectively, I do not visualize anything besides raspy voice.  Her symptoms did not improve with prednisone.  It seems that she is doing significantly better at night.  Vocal cord dysfunction should be considered as a part of her differential.  -Ordered serum tryptase level, CMP and CBC with differential.  Considering how tired appearing the patient is, and the fact that she believes in [place], I do not think that it would be completely safe to send her home.  Her friend drove her here today.  I offered to call the ambulance but the patient declined.  She is willing to drive to ED.  May need to see Pulmonology and ENT, and get other  imaging studies.    Relevant Medications    Loratadine (CLARITIN PO)    Triamcinolone Acetonide (NASACORT AQ NA)    ALBUTEROL IN    Other Relevant Orders    ALBUTEROL UNIT DOSE, 1 MG (Completed)    Spirometry, Breathing Capacity: Normal Order, Clinic Performed (Completed)    Tryptase (Completed)    Comprehensive metabolic panel (Completed)    CBC with platelets differential (Completed)    2. Chronic vasomotor rhinitis    It seems to be relatively well-controlled with Nasacort and loratadine.  In the future, we may try optimizing her symptoms with an intranasal antihistamine, but the main priority at this time is to address her other symptoms.        Relevant Medications    Loratadine (CLARITIN PO)    Triamcinolone Acetonide (NASACORT AQ NA)    ALBUTEROL IN    3. History of asthma      The patient admits that her current symptoms are totally different from her asthma symptoms.  In the past, albuterol was acutely helpful.          Thank you for allowing us to participate in the care of this patient. Please feel free to contact us if there are any questions or concerns about the patient.    Disclaimer: This note consists of symbols derived from keyboarding, dictation and/or voice recognition software. As a result, there may be errors in the script that have gone undetected. Please consider this when interpreting information found in this chart.    Jan Mesa MD, [facility]  Allergy, Asthma and Immunology  [place], [place] and [place]        Again, thank you for allowing me to participate in the care of your patient.        Sincerely,        Jan Mesa MD

## 2017-12-21 NOTE — PROGRESS NOTES
SUBJECTIVE:                                                                   Corina Alvarenga presents today to our Allergy Clinic at [facility] for a new patient visit.    She is a 47-year-old female with a historical diagnosis of asthma and environmental allergies.    The patient states that she had asthma for about 10-15 years, manifested by wheezing, chest tightness and shortness of breath.  In the past, her symptoms were URI induced and where acutely responsive to albuterol.  If symptoms persisted, she would take Advair, and in a short period of time, she would get better.  She usually does not have any problem on exertion.  She has a history of nasal congestion, rhinorrhea, postnasal drainage, sneezing and sinus pressure, but her symptoms are not as bad as her current chest symptoms.  She initially was tested several years ago in [facility].  According to her, the testing was negative.  She was repeatedly tested in August 2017 in [place], by Isiah Brown.  On my review of the results, percutaneous skin puncture testing with borderline sensitivity to the pollen of 2 trees (Birch, pine, and maple), 1 weed (cocklebur, and 2 molds (Alternaria and Aspergillus).  She also had intradermal testing with borderline sensitivity to grass pollen, molds, and dust mite.  She has been using Nasacort, and her nasal symptoms are relatively well controlled.  She takes Claritin daily as well.    The patient states that her main triggers are strong scents and odors, like detergent, Pine-Sol, perfume or cigarette smoke, that usually triggers dyspnea and raspy voice.  These symptoms became worse for the last several months.  8 days ago, the patient was at a fitness center and came in the close contact with pine branches.  She developed within minutes throat fullness, raspy voice, wheezing, and fatigue.  She was seen in ED and treated with Solu-Medrol 100 mg, Benadryl, and Pepcid.  After she  improved, she was discharged home.  She felt tired, but once she got home several hours later, she developed throat symptoms again.  She was treated with more steroids and given epinephrine that she did not tolerate well.   On my review of her vital signs posttreatment, they were all within normal limits.  The patient states that she was hospitalized for several days, and while she continued complaining of shortness of breath, dizziness, coughing and throat clearing, she was discharged home.  She had CTA that was negative.  The patient states that she was discharged home with prednisone.  She completed her last dose today.  Since her hospitalization, her shortness of breath has not improved at all.  She also complains of tingling sensation in her extremities.  She feels very fatigued and dizzy.      There is no problem list on file for this patient.      Past Medical History:   Diagnosis Date     Uncomplicated asthma       Problem (# of Occurrences) Relation (Name,Age of Onset)    Asthma (1) Daughter    CANCER (1) Father    HEART DISEASE (4) Father, Maternal Grandmother, Maternal Grandfather, Paternal Grandmother    Hypertension (3) Maternal Grandmother, Maternal Grandfather, Paternal Grandmother        Past Surgical History:   Procedure Laterality Date     HYSTERECTOMY  12/2001     Social History     Social History     Marital status: Single     Spouse name: N/A     Number of children: N/A     Years of education: N/A     Occupational History     RN      Social History Main Topics     Smoking status: Never Smoker     Smokeless tobacco: Never Used     Alcohol use No     Drug use: No     Sexual activity: Not Asked     Other Topics Concern     None     Social History Narrative    December 21, 2017    ENVIRONMENTAL HISTORY: The family lives in about a 10 year old [place]. The home is heated with a forced air. They do have central air conditioning. The patient's bedroom is furnished with feather/wool  bedding or pillows, carpeting in bedroom and fabric window coverings. No pets inside the house. There is no history of cockroach or mice infestation. There are no smokers in the house.  The house does not have a damp basement.                Review of Systems   Constitutional: Positive for fatigue. Negative for activity change, chills and fever.   HENT: Positive for congestion, postnasal drip, rhinorrhea, sinus pressure and sneezing. Negative for dental problem, ear pain, facial swelling and nosebleeds.    Eyes: Positive for discharge, redness and itching.   Respiratory: Positive for cough, chest tightness and shortness of breath. Negative for wheezing.    Cardiovascular: Positive for chest pain.   Gastrointestinal: Positive for nausea. Negative for diarrhea and vomiting.   Musculoskeletal: Negative for arthralgias, joint swelling and myalgias.   Skin: Negative for rash.   Neurological: Positive for dizziness, light-headedness and headaches.   Hematological: Negative for adenopathy.   Psychiatric/Behavioral: Negative for behavioral problems and self-injury.         No current facility-administered medications for this visit.     Current Outpatient Prescriptions:      Loratadine (CLARITIN PO), , Disp: , Rfl:      Triamcinolone Acetonide (NASACORT AQ NA), , Disp: , Rfl:      ALBUTEROL IN, , Disp: , Rfl:      KETOTIFEN FUMARATE OP, , Disp: , Rfl:      estrogens, conjugated, (PREMARIN) 1.25 MG tablet, Take 1.25 mg by mouth, Disp: , Rfl:      EPINEPHrine (EPIPEN/ADRENACLICK/OR ANY BX GENERIC EQUIV) 0.3 MG/0.3ML injection 2-pack, Inject 0.3 mg into the muscle, Disp: , Rfl:      diphenhydrAMINE (BENADRYL) 25 MG tablet, Take 25-50 mg by mouth, Disp: , Rfl:     Facility-Administered Medications Ordered in Other Visits:      lidocaine 1 % 1 mL, 1 mL, Other, Q1H PRN, Aaron Waddell MD     lidocaine (LMX4) kit, , Topical, Q1H PRN, Aaron Waddell MD     sodium chloride (PF) 0.9% PF flush 3 mL, 3 mL, Intracatheter, Q1H  "PRN, Aaron Waddell MD     sodium chloride (PF) 0.9% PF flush 3 mL, 3 mL, Intracatheter, Q8H, Aaron Waddell MD     0.9% sodium chloride BOLUS, 1,000 mL, Intravenous, Once, Last Rate: 1,000 mL/hr at 12/21/17 1317, 1,000 mL at 12/21/17 1317 **FOLLOWED BY** 0.9% sodium chloride infusion, 1,000 mL, Intravenous, Continuous, Aaron Waddell MD    There is no immunization history on file for this patient.  Allergies   Allergen Reactions     Codeine      Midrin [Isometheptene-Apap-Dichloral]      Morphine      Seasonal Allergies      Trees     Talwin [Pentazocine]      OBJECTIVE:                                                                 /80 (BP Location: Right arm, Patient Position: Left side, Cuff Size: Adult Regular)  Pulse 72  Temp 96.8  F (36  C) (Oral)  Resp 28  Ht 1.565 m (5' 1.61\")  Wt 60.1 kg (132 lb 7.9 oz)  SpO2 100%  BMI 24.54 kg/m2        Physical Exam   Constitutional: She appears distressed (tearful, winded).   HENT:   Head: Normocephalic and atraumatic.   Right Ear: Tympanic membrane, external ear and ear canal normal.   Left Ear: Tympanic membrane, external ear and ear canal normal.   Nose: Mucosal edema (mild) present.   Mouth/Throat: Oropharynx is clear and moist. No oropharyngeal exudate, posterior oropharyngeal edema or posterior oropharyngeal erythema.   Very raspy voice. Whispering.   Eyes: Conjunctivae are normal. Right eye exhibits no discharge. Left eye exhibits no discharge.   Neck: Neck supple.   Cardiovascular: Normal rate and regular rhythm.    Pulmonary/Chest: She has no wheezes. She has no rales.   Shallow breath, but when asked to take a deep breath in, the patient has a good air entry.   Musculoskeletal: Normal range of motion. She exhibits no edema.   Neurological: She is alert.   Skin: She is not diaphoretic.   Psychiatric: Affect normal.   Nursing note and vitals reviewed.      SPIROMETRY       FVC 2.68L (82% of predicted).     FEV1 2.17L (82% of " predicted).     FEV1/FVC 81%     FEF 25%-75%  2.09L/s (75% of predicted).      The office spirometry performed today with a poor effort, but FVC and FEV1 do not suggest an obstruction.  The patient became lightheaded and had near syncopal event after albuterol neb.      ASSESSMENT/PLAN:         Visit Diagnoses     1. Dyspnea, unspecified type    -  Primary  She developed mild blotchiness in the upper chest area, while getting the albuterol neb.  No true urticaria.  The rest of the skin she has some freckles. Negative for urticaria or angioedema. Negative  Darier's sign.  Vital signs within normal limits.  No wheezing on exam.  The patient complained of dizziness, increased tingling sensation in her extremities.  Symptoms improved after lying down for 30 minutes.    She still seems to be disproportionately short of breath.    I am not quite sure whether the patient truly had anaphylaxis considering that she has been having her symptoms for the last week and besides her sensations of shortness of breath, dizziness, tingling sensation in her extremities, objectively, I do not visualize anything besides raspy voice.  Her symptoms did not improve with prednisone.  It seems that she is doing significantly better at night.  Vocal cord dysfunction should be considered as a part of her differential.  -Ordered serum tryptase level, CMP and CBC with differential.  Considering how tired appearing the patient is, and the fact that she believes in [place], I do not think that it would be completely safe to send her home.  Her friend drove her here today.  I offered to call the ambulance but the patient declined.  She is willing to drive to ED.  May need to see Pulmonology and ENT, and get other imaging studies.    Relevant Medications    Loratadine (CLARITIN PO)    Triamcinolone Acetonide (NASACORT AQ NA)    ALBUTEROL IN    Other Relevant Orders    ALBUTEROL UNIT DOSE, 1 MG (Completed)    Spirometry, Breathing Capacity: Normal  Order, Clinic Performed (Completed)    Tryptase (Completed)    Comprehensive metabolic panel (Completed)    CBC with platelets differential (Completed)    2. Chronic vasomotor rhinitis    It seems to be relatively well-controlled with Nasacort and loratadine.  In the future, we may try optimizing her symptoms with an intranasal antihistamine, but the main priority at this time is to address her other symptoms.        Relevant Medications    Loratadine (CLARITIN PO)    Triamcinolone Acetonide (NASACORT AQ NA)    ALBUTEROL IN    3. History of asthma      The patient admits that her current symptoms are totally different from her asthma symptoms.  In the past, albuterol was acutely helpful.          Thank you for allowing us to participate in the care of this patient. Please feel free to contact us if there are any questions or concerns about the patient.    Disclaimer: This note consists of symbols derived from keyboarding, dictation and/or voice recognition software. As a result, there may be errors in the script that have gone undetected. Please consider this when interpreting information found in this chart.    Jan Mesa MD, FACI  Allergy, Asthma and Immunology  [place], [place] and [place]

## 2017-12-21 NOTE — NURSING NOTE
The following medication was given:     MEDICATION: Albuterol Sulfate 0.083%  ROUTE: Inhale  SITE: mouth  DOSE: 2.5 mg/3 mL  LOT #: [id number]  :  [employer]  EXPIRATION DATE:  4/2018  NDC#: 5630-2524-95

## 2017-12-21 NOTE — NURSING NOTE
"Chief Complaint   Patient presents with     Allergy Consult     2nd opinion, was seen at [facility] in [place]     Asthma Consult       Initial /81 (BP Location: Left arm, Patient Position: Sitting, Cuff Size: Adult Regular)  Pulse 55  Temp 96.8  F (36  C) (Oral)  Resp 16  Ht 1.565 m (5' 1.61\")  Wt 60.1 kg (132 lb 7.9 oz)  SpO2 100%  BMI 24.54 kg/m2 Estimated body mass index is 24.54 kg/(m^2) as calculated from the following:    Height as of this encounter: 1.565 m (5' 1.61\").    Weight as of this encounter: 60.1 kg (132 lb 7.9 oz).  Medication Reconciliation: complete    "

## 2017-12-21 NOTE — IP AVS SNAPSHOT
MRN:[id number]                      After Visit Summary   12/21/2017    Corina Alvarenga    MRN: [id number]           Thank you!     Thank you for choosing [facility] for your care. Our goal is always to provide you with excellent care. Hearing back from our patients is one way we can continue to improve our services. Please take a few minutes to complete the written survey that you may receive in the mail after you visit with us. Thank you!        Patient Information     Date Of Birth          1970        About your hospital stay     You were admitted on:  December 21, 2017 You last received care in the:  Unit [facility] Observation [facility]    You were discharged on:  December 22, 2017        Reason for your hospital stay       Shortness of breath  Dysphonia                  Who to Call     For medical emergencies, please call 911.  For non-urgent questions about your medical care, please call your primary care provider or clinic, None          Attending Provider     Provider Specialty    Aaron Waddell MD Emergency Medicine       Primary Care Provider Fax #    Physician No Ref-Primary [phone]       When to contact your care team       Contact your care team if:   Your signs and symptoms are the same or worse within 24 hours of treatment.   You have shaking chills or a fever over 102 F.   You have new pain, pressure, or tightness in your chest.   You have a new or worse cough or wheezing, or you cough up blood.   You feel like you cannot get enough air.   You feel confused or dizzy.                  After Care Instructions     Activity       Your activity upon discharge: activity as tolerated            Diet       Follow this diet upon discharge: Regular            Discharge Instructions       You were admitted for shortness of breath secondary to allergic reaction. While in the hospital, you were evaluated by ENT who diagnosed with muscle tension dysphonia. You were also assessed  by speech therapy for further guidance. They recommended exercises to relax your vocal cords at home. We also placing a referral for speech evaluation as an outpatient. We have included in your discharge instruction the facility address and phone # to contact to make an appointment.                  Follow-up Appointments     Follow Up and recommended labs and tests       Follow up for speech evaluation in 1 week: Referral will be placed.  Per patient request:  [facility])  [address] [place] [address]  General Contact:  Phone: [phone]                  Additional Services     Speech Pathology Referral       [facility])  [address] [place] [address]    General Contact:  Phone: [phone]                  Pending Results     Date and Time Order Name Status Description    12/21/2017 1239 EKG 12-lead, tracing only Preliminary     12/21/2017 1114 TRYPTASE In process             Statement of Approval     Ordered          12/22/17 1434  I have reviewed and agree with all the recommendations and orders detailed in this document.  EFFECTIVE NOW     Approved and electronically signed by:  Idania Fabian PA-C             Admission Information     Date & Time Provider Department Dept. Phone    12/21/2017 Aaron Waddell MD Unit [facility]D Observation [facility] [phone]      Your Vitals Were     Blood Pressure Pulse Temperature Respirations Weight Pulse Oximetry    105/89 78 98  F (36.7  C) (Oral) 20 60.1 kg (132 lb 7.9 oz) 97%    BMI (Body Mass Index)                   24.54 kg/m2           MyChart Information     [facility] gives you secure access to your electronic health record. If you see a primary care provider, you can also send messages to your care team and make appointments. If you have questions, please call your primary care clinic.  If you do not have a primary care provider, please call  [phone] and they will assist you.        Care EveryWhere ID     This is your Care EveryWhere ID. This could be used by other organizations to access your [facility] medical records  [id number]        Equal Access to Services     NICHOLAS MENDOZA : Hadii aad ku hadmikaerika Jaylene, waaxda luqadaha, qaybta kaalmada adejessica, darius figueroasade trevinochloe orlando tommy reece. So [facility] [phone].    ATENCIÓN: Si habla español, tiene a hernandez disposición servicios gratuitos de asistencia lingüística. Llame al [phone].    We comply with applicable federal civil rights laws and [place] laws. We do not discriminate on the basis of race, color, national origin, age, disability, sex, sexual orientation, or gender identity.               Review of your medicines      CONTINUE these medicines which have NOT CHANGED        Dose / Directions    ALBUTEROL IN        Refills:  0       CLARITIN PO        Refills:  0       diphenhydrAMINE 25 MG tablet   Commonly known as:  BENADRYL        Dose:  25-50 mg   Take 25-50 mg by mouth   Refills:  0       EPINEPHrine 0.3 MG/0.3ML injection 2-pack   Commonly known as:  EPIPEN/ADRENACLICK/or ANY BX GENERIC EQUIV        Dose:  0.3 mg   Inject 0.3 mg into the muscle   Refills:  0       estrogens (conjugated) 1.25 MG tablet   Commonly known as:  PREMARIN        Dose:  1.25 mg   Take 1.25 mg by mouth   Refills:  0       KETOTIFEN FUMARATE OP        Refills:  0       NASACORT AQ NA        Refills:  0                Protect others around you: Learn how to safely use, store and throw away your medicines at www.disposemymeds.org.             Medication List: This is a list of all your medications and when to take them. Check marks below indicate your daily home schedule. Keep this list as a reference.      Medications           Morning Afternoon Evening Bedtime As Needed    ALBUTEROL IN                                CLARITIN PO                                diphenhydrAMINE 25 MG tablet   Commonly known  as:  BENADRYL   Take 25-50 mg by mouth                                EPINEPHrine 0.3 MG/0.3ML injection 2-pack   Commonly known as:  EPIPEN/ADRENACLICK/or ANY BX GENERIC EQUIV   Inject 0.3 mg into the muscle                                estrogens (conjugated) 1.25 MG tablet   Commonly known as:  PREMARIN   Take 1.25 mg by mouth                                KETOTIFEN FUMARATE OP                                NASACORT AQ NA

## 2017-12-21 NOTE — MR AVS SNAPSHOT
After Visit Summary   12/21/2017    Corian Alvarenga    MRN: [id number]           Patient Information     Date Of Birth          1970        Visit Information        Provider Department      12/21/2017 9:40 AM Jan Mesa MD [facility]        Today's Diagnoses     Dyspnea, unspecified type    -  1    Chronic vasomotor rhinitis        History of asthma           Follow-ups after your visit        Future tests that were ordered for you today     Open Standing Orders        Priority Remaining Interval Expires Ordered    Oxygen: Nasal cannula, Oxygen mask STAT 43651/82037 CONTINUOUS  12/21/2017            Who to contact     If you have questions or need follow up information about today's clinic visit or your schedule please contact [facility] directly at [phone].  Normal or non-critical lab and imaging results will be communicated to you by [employer], letter or phone within 4 business days after the clinic has received the results. If you do not hear from us within 7 days, please contact the clinic through [employer] or phone. If you have a critical or abnormal lab result, we will notify you by phone as soon as possible.  Submit refill requests through [employer] or call your pharmacy and they will forward the refill request to us. Please allow 3 business days for your refill to be completed.          Additional Information About Your Visit        MyChart Information     [employer] gives you secure access to your electronic health record. If you see a primary care provider, you can also send messages to your care team and make appointments. If you have questions, please call your primary care clinic.  If you do not have a primary care provider, please call [phone] and they will assist you.        Care EveryWhere ID     This is your Care EveryWhere ID. This could be used by other organizations to access your [facility] medical records  [id number]        Your  "Vitals Were     Pulse Temperature Respirations Height Pulse Oximetry BMI (Body Mass Index)    72 96.8  F (36  C) (Oral) 28 1.565 m (5' 1.61\") 100% 24.54 kg/m2       Blood Pressure from Last 3 Encounters:   12/21/17 131/83   12/21/17 141/80    Weight from Last 3 Encounters:   12/21/17 60.1 kg (132 lb 7.9 oz)   12/21/17 60.1 kg (132 lb 7.9 oz)              We Performed the Following     ALBUTEROL UNIT DOSE, 1 MG     CBC with platelets differential     Comprehensive metabolic panel     INHALATION/NEBULIZER TREATMENT, INITIAL     Spirometry, Breathing Capacity: Normal Order, Clinic Performed     Tryptase        Primary Care Provider Fax #    Physician No Ref-Primary [phone]       No address on file        Equal Access to Services     NICHOLAS MENDOZA : Fili Mariee, wajuliette pisano, qaybta kaalmada byron, darius sanders . So [facility] [phone].    ATENCIÓN: Si habla español, tiene a hernandez disposición servicios gratuitos de asistencia lingüística. Llame al [phone].    We comply with applicable federal civil rights laws and [place] laws. We do not discriminate on the basis of race, color, national origin, age, disability, sex, sexual orientation, or gender identity.            Thank you!     Thank you for choosing [facility]  for your care. Our goal is always to provide you with excellent care. Hearing back from our patients is one way we can continue to improve our services. Please take a few minutes to complete the written survey that you may receive in the mail after your visit with us. Thank you!             Your Updated Medication List - Protect others around you: Learn how to safely use, store and throw away your medicines at www.disposemymeds.org.          This list is accurate as of: 12/21/17  1:32 PM.  Always use your most recent med list.                   Brand Name Dispense Instructions for use Diagnosis    ALBUTEROL IN           CLARITIN PO "           diphenhydrAMINE 25 MG tablet    BENADRYL     Take 25-50 mg by mouth        EPINEPHrine 0.3 MG/0.3ML injection 2-pack    EPIPEN/ADRENACLICK/or ANY BX GENERIC EQUIV     Inject 0.3 mg into the muscle        estrogens (conjugated) 1.25 MG tablet    PREMARIN     Take 1.25 mg by mouth        KETOTIFEN FUMARATE OP           NASACORT AQ NA

## 2017-12-21 NOTE — IP AVS SNAPSHOT
Unit 6D Observation [address] [address]    Phone:  [phone]    Fax:  [phone]                                       After Visit Summary   12/21/2017    Corina Alvarenga    MRN: [id number]           After Visit Summary Signature Page     I have received my discharge instructions, and my questions have been answered. I have discussed any challenges I see with this plan with the nurse or doctor.    ..........................................................................................................................................  Patient/Patient Representative Signature      ..........................................................................................................................................  Patient Representative Print Name and Relationship to Patient    ..................................................               ................................................  Date                                            Time    ..........................................................................................................................................  Reviewed by Signature/Title    ...................................................              ..............................................  Date                                                            Time

## 2017-12-22 ENCOUNTER — APPOINTMENT (OUTPATIENT)
Dept: SPEECH THERAPY | Facility: CLINIC | Age: 47
End: 2017-12-22
Attending: PHYSICIAN ASSISTANT
Payer: COMMERCIAL

## 2017-12-22 VITALS
DIASTOLIC BLOOD PRESSURE: 89 MMHG | RESPIRATION RATE: 20 BRPM | SYSTOLIC BLOOD PRESSURE: 105 MMHG | BODY MASS INDEX: 24.54 KG/M2 | WEIGHT: 132.5 LBS | OXYGEN SATURATION: 97 % | HEART RATE: 78 BPM | TEMPERATURE: 98 F

## 2017-12-22 PROCEDURE — 92524 BEHAVRAL QUALIT ANALYS VOICE: CPT | Mod: GN | Performed by: SPEECH-LANGUAGE PATHOLOGIST

## 2017-12-22 PROCEDURE — 92507 TX SP LANG VOICE COMM INDIV: CPT | Mod: GN | Performed by: SPEECH-LANGUAGE PATHOLOGIST

## 2017-12-22 PROCEDURE — G0378 HOSPITAL OBSERVATION PER HR: HCPCS

## 2017-12-22 PROCEDURE — 99217 ZZC OBSERVATION CARE DISCHARGE: CPT | Mod: Z6 | Performed by: EMERGENCY MEDICINE

## 2017-12-22 PROCEDURE — 40000225 ZZH STATISTIC SLP WARD VISIT: Performed by: SPEECH-LANGUAGE PATHOLOGIST

## 2017-12-22 NOTE — PROGRESS NOTES
12/22/17 1301   General Information, SLP   Type of Evaluation Voice   Type of Visit Initial   Start of Care Date 12/22/17   Onset of Illness/Injury or Date of Surgery - Date 12/21/17   Referring Physician Dr. Fabian   Patient/Family Goals Statement Pt's goal is to get exercises to help her breath    Pertinent History of Current Problem Pt is a 47 year old female with history of asthma presents to the ED today from Allergy clinic with shortness of breath over the last 8 days.   She states that last Wednesday or so, she felt as if she was exposed to something in the environment, possibly pine, and had an allergic reaction. She felt like her throat swelled, and that her voice became weak. She went to an outside ED and received steroids, antihistamines and one dose of epinephrine. Her symptoms for the most part resolved. Although, she was admitted to the hospital and underwent an extensive workup for SOB, including an echo, and CT Angio for PE. The patient states that these tests came back normal. She presents again after another episode of what the patient calls an allergic reaction, she has SOB, a sensation of throat swelling and a weak voice. She was at an outpatient allergy appointment and was sent to the ED, her symptoms did not seem to be related to allergies. She has no recent infections. She has no fevers or chills, she has no N/V, no chest pain. She feels comfortable in bed, but expresses dyspnea on exertion.  Pt underwent ENT evaluation with following impression: The turbinates were normal.  The inferior and middle meati were clear bilaterally without purulence, masses, or polyps.  The nasopharynx was clear.  The Eustachian tubes were clear.  The soft palate appeared normal with good mobility.  The epiglottis was sharp and the visualized portion of the vallecula was clear. The arytenoids were clear and there was no pooling in the hypopharynx.  Normal airway, bilateral cords move normally, mild glottic gap,  significant recruitment of false cords during phonation.  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  ST asked to evaluate patient to provide education on exercises to help her breath.     Precautions/Limitations no known precautions/limitations   Oral Motor Sensory Function   Deficits noted in Labial Function (m-VII, S-V) None   Deficits noted in Lingual Function (m-XII, s-V, VII, IX, XII) None   Deficits noted in Mandibular Function (m-V) None   Deficits noted in Velar Function (m-V, X, XI, s-IX) None   Deficits noted in Laryngeal Function (m-X, s-X) Other (Comment)  (patient dysphonic)   Speech   Deficits in Speech Respiration Shallow   Deficits in Phonation Breathy quality;Hoarse quality;Other (Comment)  (dysphonic; pt indicates diff breathing during phonation)   Sustained vowel production 4   S/Z Ratio 7.8  (s:39 sec;   z:5 sec)   Deficits in Resonance Other (Comment)  (straining noted with voicing at level of cords)   Speech Comments Pt become significantly SOB w/ associated light headedness after S;Z ratio test.  Pt needed to cue pt to breath until she recovered; about 10 min to recover.    Clinical Impression, SLP Eval   Criteria for Skilled Therapeutic Interventions Met Treatment indicated   SLP Diagnosis SLP: Pt with diagnosis of muscle tone dysphonia; with ST questioning vocal cord dysfunction.  Pt seen prior to discharge to provide education on exerices to help her breath better and voice. Pt with notable dysphonia and anxiety.  Pt had s:z ration of 7.8 with s:39 sec and z: 5 sec. Pt become significantly SOB and had associated light headedness after s:z ratio test.  Pt needed cues to have calm deep breathing for approx 10 min before she recovered. Pt given education on easy onset ex and vocal resonant exercise as well as given straw to assist with training appropriate direct flow to tip of lips to reduce strain at level of larynx. Pt verbalized understanding for all information.  Recommend Pt f/u  with OP voice clinic for further voice therapy upon discharge today.  Pt in agreement with plan.    Influenced by the following factors/impairments anxiety   Rehab Potential Fair, will monitor progress closely   Rehab potential affected by anxiety   Therapy Frequency Other (see comments)  (f/u as OP in voice clinic)   Anticipated Discharge Disposition Home with Outpatient Therapy   Risks and Benefits of Treatment have been explained. Yes   Patient, Family & other staff in agreement with plan of care Yes   Total Evaluation Time      Total Evaluation Time (Minutes) 40

## 2017-12-22 NOTE — H&P
[facility] ED Observation Admission Note    Chief Complaint   Patient presents with     Shortness of Breath       Assessment:  Corina Alvarenga is a 47 year old female with history of asthma presents to the ED today from Allergy clinic with shortness of breath over the last 8 days.    ACUTE PROBLEMS:     1. Shortness of breath.  Patient states that last week she had an allergic reaction to some sort of pain based scent.  She used her albuterol, but ultimately had to use an EpiPen which did help her, but she had a delayed recurrent allergic reaction, was seen in the emergency room and given a second EpiPen and required a 2 day hospital stay.  She was referred and evaluated today at [facility] for SOB, wheezing, and chest tightness. She had spirometry, an albuterol neb. Spirometry showed poor effort but no obstructive pattern. She was sent to the ED for further workup.  She did not have hypoxia, but did have SANDOVAL, labs showed a normal normal CMP, CBC, INR, PTT. CXR was also unremarkable.  EKG was without ischemic changes, negative troponin and BNP.  Patient has no known cardiac risk factors she has recently had a CTA chest at outside facility on 12/15, and a normal bedside echo 12/15. ENT evaluated the patient in ED and performed laryngoscopy and noted muscle tension dysphonia, which is likely the cause of her symptoms.  Plan is for ED observation for respiratory monitoring with speech consult in the morning,  - continuous pulse oximetry  - frequent reassessment  - speech consult in AM  - albuterol nebs prn shortness of breath/wheezing  -Has finished a course of prednisone today  - continue PTA Claritin,    Consults: Speech  FEN: ADAT, general diet  DVT prophylaxis: early ambulation  Code Status: Full  Disposition: discharge tomorrow after speech consult      HPI:  Corina Alvarenga is a 47 year old female with history of asthma presents to the ED today from Allergy clinic with shortness of breath.  Per ED  "provider note, \"Per [facility]'s Care Everywhere, patient was in the ED from 12/13-12/15 for wheezing, weakness, and raspiness of voice after exposure to pine branches.  She was treated with Solu-Medrol, Benadryl, and Pepcid.  Her symptoms improved.  However, she had returned to the ED because her throat symptoms returned again was treated with subcutaneous epinephrine.  She had a bedside cardiac ultrasound performed on 12/15 was negative for pericardial effusion.  She also had a CT angiogram of the chest which was negative for PE.  Per her allergy clinic note today, she did have a positive allergic reaction to birch, pollen, and maple trees in 8/2017 among 1 weed and 2 molds.  Patient today has complaints of shortness of breath, chest tightness, wheezing, lightheadedness, dizziness, voice changes, nausea, and loss of appetite which started again last night.  She also states that she feels exhausted whenever she walks short distances. She states she took her albuterol inhaler with no improvement of her symptoms and states that her symptoms feel different from her asthma.  She does state that recently her allergies have been worse lately.  She also states she tried a DuoNeb treatment and nearly passed out from this.   She is so currently taking Claritin, Nasacort, and prednisone for symptoms with no relief.  She denies currently denies any history of recent travel, previous history of heart problems, or prior history of DVTs or PEs .    ED course significant for:  -normal VS  -CBC, BMP normal  -CXR normal  -EKG normal  -ENT consult resulting in direct laryngoscopy, showing muscle tension dysphonia.    On admission to the observation unit the patient was stable.    History:    Past Medical History:   Diagnosis Date     Uncomplicated asthma        Past Surgical History:   Procedure Laterality Date     HYSTERECTOMY  12/2001       Family History   Problem Relation Age of Onset     HEART DISEASE Father      CANCER Father  "     Hypertension Maternal Grandmother      HEART DISEASE Maternal Grandmother      Hypertension Maternal Grandfather      HEART DISEASE Maternal Grandfather      Hypertension Paternal Grandmother      HEART DISEASE Paternal Grandmother      Asthma Daughter        Social History     Social History     Marital status: Single     Spouse name: N/A     Number of children: N/A     Years of education: N/A     Occupational History     RN      Social History Main Topics     Smoking status: Never Smoker     Smokeless tobacco: Never Used     Alcohol use No     Drug use: No     Sexual activity: Not on file     Other Topics Concern     Not on file     Social History Narrative    December 21, 2017    ENVIRONMENTAL HISTORY: The family [place] in about a 10 year [place]. The home is heated with a forced air. They do have central air conditioning. The patient's bedroom is furnished with feather/wool bedding or pillows, carpeting in bedroom and fabric window coverings. No pets inside the house. There is no history of cockroach or mice infestation. There are no smokers in the house.  The house does not have a damp basement.              No current facility-administered medications on file prior to encounter.   Current Outpatient Prescriptions on File Prior to Encounter:  Loratadine (CLARITIN PO)    Triamcinolone Acetonide (NASACORT AQ NA)    ALBUTEROL IN    KETOTIFEN FUMARATE OP    estrogens, conjugated, (PREMARIN) 1.25 MG tablet Take 1.25 mg by mouth   EPINEPHrine (EPIPEN/ADRENACLICK/OR ANY BX GENERIC EQUIV) 0.3 MG/0.3ML injection 2-pack Inject 0.3 mg into the muscle   diphenhydrAMINE (BENADRYL) 25 MG tablet Take 25-50 mg by mouth       Data:    Results for orders placed or performed during the hospital encounter of 12/21/17   XR Chest Port 1 View    Narrative    EXAM: XR CHEST PORT 1 VW  12/21/2017 1:16 PM     HISTORY:  sob;        COMPARISON: None    FINDINGS: Single portable upright AP view of chest. Cardiac  silhouette  is within normal limits. No pneumothorax. No pleural effusion. No  focal airspace opacity. Upper abdomen is unremarkable.      Impression    IMPRESSION: No acute cardiopulmonary findings. Clear lungs.    I have personally reviewed the examination and initial interpretation  and I agree with the findings.    MARY OCAMPO MD   CBC with platelets differential   Result Value Ref Range    WBC 10.2 4.0 - 11.0 10e9/L    RBC Count 4.82 3.8 - 5.2 10e12/L    Hemoglobin 15.1 11.7 - 15.7 g/dL    Hematocrit 45.7 35.0 - 47.0 %    MCV 95 78 - 100 fl    MCH 31.3 26.5 - 33.0 pg    MCHC 33.0 31.5 - 36.5 g/dL    RDW 13.0 10.0 - 15.0 %    Platelet Count 232 150 - 450 10e9/L    Diff Method Automated Method     % Neutrophils 80.1 %    % Lymphocytes 16.7 %    % Monocytes 2.6 %    % Eosinophils 0.0 %    % Basophils 0.2 %    % Immature Granulocytes 0.4 %    Nucleated RBCs 0 0 /100    Absolute Neutrophil 8.2 1.6 - 8.3 10e9/L    Absolute Lymphocytes 1.7 0.8 - 5.3 10e9/L    Absolute Monocytes 0.3 0.0 - 1.3 10e9/L    Absolute Eosinophils 0.0 0.0 - 0.7 10e9/L    Absolute Basophils 0.0 0.0 - 0.2 10e9/L    Abs Immature Granulocytes 0.0 0 - 0.4 10e9/L    Absolute Nucleated RBC 0.0    INR   Result Value Ref Range    INR 1.07 0.86 - 1.14   Partial thromboplastin time   Result Value Ref Range    PTT 25 22 - 37 sec   Comprehensive metabolic panel   Result Value Ref Range    Sodium 140 133 - 144 mmol/L    Potassium 3.4 3.4 - 5.3 mmol/L    Chloride 105 94 - 109 mmol/L    Carbon Dioxide 28 20 - 32 mmol/L    Anion Gap 6 3 - 14 mmol/L    Glucose 107 (H) 70 - 99 mg/dL    Urea Nitrogen 10 7 - 30 mg/dL    Creatinine 0.79 0.52 - 1.04 mg/dL    GFR Estimate 78 >60 mL/min/1.7m2    GFR Estimate If Black >90 >60 mL/min/1.7m2    Calcium 9.1 8.5 - 10.1 mg/dL    Bilirubin Total 0.6 0.2 - 1.3 mg/dL    Albumin 4.0 3.4 - 5.0 g/dL    Protein Total 7.8 6.8 - 8.8 g/dL    Alkaline Phosphatase 61 40 - 150 U/L    ALT 26 0 - 50 U/L    AST 13 0 - 45 U/L   Troponin I    Result Value Ref Range    Troponin I ES <0.015 0.000 - 0.045 ug/L   Nt probnp inpatient (BNP)   Result Value Ref Range    N-Terminal Pro BNP Inpatient 35 0 - 450 pg/mL   EKG 12-lead, tracing only   Result Value Ref Range    Interpretation ECG Click View Image link to view waveform and result              EKG Interpretation:      Interpreted by Veronica Rodriguez  Symptoms at time of EKG: NONE    Rhythm: Normal sinus   Rate:   Axis: Normal  Ectopy: None  Conduction: Normal  ST Segments/ T Waves: No ST-T wave changes, No acute ischemic changes  Q Waves: None  Comparison to prior: No old EKG available  Clinical Impression: normal EKG    ROS:    Review Of Systems  General: denies fevers. Admits to fatigue. Denies malaise, body aches.  Eyes: denies vision changes.  Ears/Nose/Throat: admits to rhinorrhea, sinus pressure, ear popping and pain, sore throat, hoarseness.  Respiratory: admits to shortness of breath, cough (non-productive).   Cardiovascular: admits to chest tightness. Denies leg swelling.  Gastrointestinal: admits to nausea. Denies vomiting, abdominal pain, diarrhea, bloody or tarry stools.  Neurologic: admits to headaches, lightheadedness, tingling of arms and legs    Exam:    Vitals:  B/P: 120/85, T: 97.6, P: Data Unavailable, R: 20    General: alert, appears comfortable at rest, but speaks in a raspy voice and occasionally stops to draw in deep breath when speaking. NAD at rest. Afebrile.  Skin: no suspicious lesions or rashes   Head: Normocephalic.  EENT: Ears: no TM erythema or bulging. Oropharynx: MMM. Mild posterior pharyngeal erythema without edema. Sinuses: mild bilateral maxillary sinus TTP.  Neck: Neck supple. No lymphadenopathy.  Respiratory: Mild tachypnea when speaking or ambulating. No distress at rest. Equal inspiration to bilateral bases. Lungs CTAB- no crackles, wheezes, or rales.  Cardiovascular: RRR. No murmurs, clicks gallops or rub. No peripheral edema. DP pulses 2+ bilaterally.    Gastrointestinal: Abdomen soft. BS normal. No masses, organomegaly.   Musculoskeletal: extremities normal  Neurologic: Follows commands.  Psychiatric: mentation appears normal and affect normal/bright     Signed:  Cyndy Molina, MSN, APRN, CNP

## 2017-12-22 NOTE — PLAN OF CARE
Problem: Patient Care Overview  Goal: Plan of Care/Patient Progress Review  SLP: Orders received for voice evaluation for muscle tension dysphonia diagnosed by ENT service 12/21/2017.  Voice evaluation to be deferred in the inpatient setting.  Discussed f/u recommendations with MD team and recommended at [place] [facility] on an OP basis.

## 2017-12-22 NOTE — PLAN OF CARE
Problem: Patient Care Overview  Goal: Plan of Care/Patient Progress Review  Outpatient/Observation goals to be met before discharge home:  -Diagnostic tests and consults completed and resulted : No.  -Vital signs normal or at patient baseline: Yes  -No shortness of breath or is tolerating transient SOB: Denies SOB, SAT~98% on RA   Denies throat swelling and pain. Dysphonia remaining, speech consult in AM.Will continue to monitor.

## 2017-12-22 NOTE — PROGRESS NOTES
S.  Patient 47-year-old female admitted to ED observation for difficulty breathing over the last week since allergic reaction.  Patient seen by myself in the ER.  We did have ENT see the patient.  At this point patient has vocal cord dysphonia.  Patient admitted to ED observation observing overnight and treating any other situation and will plan for speech therapy to see as treatment recommendations per ENT.  O:/90  Temp 98.4  F (36.9  C) (Oral)  Resp 24  Wt 60.1 kg (132 lb 7.9 oz)  SpO2 99%  BMI 24.54 kg/m2  Patient cooperative soft-spoken exam unchanged.  A: Muscle tension dysphonia.  With this dyspnea on exertion.  P: Patient admitted to ED observation overnight plan for speech therapy to see for recommendations for treating this rehab.  Patient agrees with plan.

## 2017-12-22 NOTE — PLAN OF CARE
Problem: Patient Care Overview  Goal: Plan of Care/Patient Progress Review  Discharge Planner SLP   Patient plan for discharge: home  Current status: SLP: Pt with diagnosis of muscle tone dysphonia; with ST questioning vocal cord dysfunction.  Pt seen prior to discharge to provide education on exerices to help her breath better and voice. Pt with notable dysphonia and anxiety.  Pt had s:z ration of 7.8 with s:39 sec and z: 5 sec. Pt become significantly SOB and had associated light headedness after s:z ratio test.  Pt needed cues to have calm deep breathing for approx 10 min before she recovered. Pt given education on easy onset ex and vocal resonant exercise as well as given straw to assist with training appropriate direct flow to tip of lips to reduce strain at level of larynx. Pt verbalized understanding for all information.  Recommend Pt f/u with OP voice clinic for further voice therapy upon discharge today.  Pt in agreement with plan.   Barriers to return to prior living situation: none  Recommendations for discharge: OP voice tx  Rationale for recommendations: Pt will need ongoing voice tx with specialty in voice and videostobe       Entered by: Nisha Bird 12/22/2017 4:37 PM

## 2017-12-22 NOTE — PLAN OF CARE
Problem: Patient Care Overview  Goal: Individualization & Mutuality  Outcome: Adequate for Discharge Date Met: 12/22/17  Reviewed discharge order and provided with the copy. Discharged to home, accompanied by her spouse.

## 2017-12-22 NOTE — CONSULTS
ENT Consult note     12/21/2017    ENT was asked by Aaron Waddell to evaluate Ms Corina Alvarenga for dysphonia and concerns of SOB.    Mrs Alvarenga is a 46 y/o who presents to the ED for SOB and dysphonia. She states that last Wednesday or so, she felt as if she was exposed to something in the environment, possibly pine, and had an allergic reaction. She felt like her throat swelled, and that her voice became weak. She went to an outside ED and received steroids, antihistamines and one dose of epinephrine. Her symptoms for the most part resolved. Although, she was admitted to the hospital and underwent an extensive workup for SOB, including an echo, and CT Angio for PE. The patient states that these tests came back normal. She presents again after another episode of what the patient calls an allergic reaction, she has SOB, a sensation of throat swelling and a weak voice. She was at an outpatient allergy appointment and was sent to the ED, her symptoms did not seem to be related to allergies. She has no recent infections. She has no fevers or chills, she has no N/V, no chest pain. She feels comfortable in bed, but expresses dyspnea on exertion.     Past Medical History:   Diagnosis Date     Uncomplicated asthma      Past Surgical History:   Procedure Laterality Date     HYSTERECTOMY  12/2001     Social History     Social History     Marital status: Single     Spouse name: N/A     Number of children: N/A     Years of education: N/A     Occupational History     RN      Social History Main Topics     Smoking status: Never Smoker     Smokeless tobacco: Never Used     Alcohol use No     Drug use: No     Sexual activity: Not on file     Other Topics Concern     Not on file     Social History Narrative    December 21, 2017    ENVIRONMENTAL HISTORY: The family [place] in about a 10 year [place]. The home is heated with a forced air. They do have central air conditioning. The patient's bedroom is  furnished with feather/wool bedding or pillows, carpeting in bedroom and fabric window coverings. No pets inside the house. There is no history of cockroach or mice infestation. There are no smokers in the house.  The house does not have a damp basement.            Family History   Problem Relation Age of Onset     HEART DISEASE Father      CANCER Father      Hypertension Maternal Grandmother      HEART DISEASE Maternal Grandmother      Hypertension Maternal Grandfather      HEART DISEASE Maternal Grandfather      Hypertension Paternal Grandmother      HEART DISEASE Paternal Grandmother      Asthma Daughter         Allergies   Allergen Reactions     Codeine      Midrin [Isometheptene-Apap-Dichloral]      Morphine      Seasonal Allergies      Trees     Talwin [Pentazocine]        Current Facility-Administered Medications   Medication     lidocaine 1 % 1 mL     lidocaine (LMX4) kit     sodium chloride (PF) 0.9% PF flush 3 mL     sodium chloride (PF) 0.9% PF flush 3 mL     0.9% sodium chloride infusion     acetaminophen (TYLENOL) tablet 650 mg     Current Outpatient Prescriptions   Medication     Loratadine (CLARITIN PO)     Triamcinolone Acetonide (NASACORT AQ NA)     ALBUTEROL IN     KETOTIFEN FUMARATE OP     estrogens, conjugated, (PREMARIN) 1.25 MG tablet     EPINEPHrine (EPIPEN/ADRENACLICK/OR ANY BX GENERIC EQUIV) 0.3 MG/0.3ML injection 2-pack     diphenhydrAMINE (BENADRYL) 25 MG tablet     E:  /90  Temp 97.6  F (36.4  C) (Oral)  Resp 20  Wt 60.1 kg (132 lb 7.9 oz)  SpO2 96%  BMI 24.54 kg/m2    PHYSICAL EXAMINATION:    General: comfortable  Skin: Warm and pink.    Neurologic:  Alert and oriented x 3.  CN's III-XII within normal limits.  Respiratory:  The patient is anxious and this causes her to breath loudly, although no suspected anatomical stridor or adventitious breath sounds  Head:  Normocephalic and atraumatic.  No lesions or scars.    Ears:  Pinnae and tragus non-tender.  EAC's and TM's were  clear.     Nose:  Sinuses were non-tender.  Anterior rhinoscopy revealed midline septum and absence of purulence or polyps.    OC/OP:  Normal tongue, floor of mouth, buccal mucosa, and palate.  No lesions or masses on inspection or palpation.   Neck:  Supple with normal laryngeal and tracheal landmarks.  The parotid beds were without masses.  No palpable thyroid.  Lymphatic:  There is no palpable lymphadenopathy in the neck.      Fiberoptic Endoscopy:   The nose was topically anesthetized. The fiberoptic laryngoscope was passed under endoscopic vision.  The turbinates were normal.  The inferior and middle meati were clear bilaterally without purulence, masses, or polyps.  The nasopharynx was clear.  The Eustachian tubes were clear.  The soft palate appeared normal with good mobility.  The epiglottis was sharp and the visualized portion of the vallecula was clear. The arytenoids were clear and there was no pooling in the hypopharynx.  Normal airway, bilateral cords move normally, mild glottic gap, significant recruitment of false cords during phonation.     A/P:  - likely muscle tension dysphonia, false fold recruitment and vocal strain   - can f/u outpatient with speech therapy for vocal exercises  - patient is stable for discharge in regards to airway concerns  - patient can follow up outpatient with ENT as needed

## 2017-12-22 NOTE — DISCHARGE SUMMARY
Discharge Summary    Corina Alvarenga MRN# [id number]   YOB: 1970 Age: 47 year old     Date of Admission:  12/21/2017  Date of Discharge:  12/22/2017  2:59 PM  Admitting Physician:  Aaron Waddell MD  Discharge Physician:  CLAUDE JUNE  Discharging Service:  Emergency Department Observation Unit     Primary Provider: No Ref-Primary, Physician          Discharge Diagnosis:     Shortness of breath    * No resolved hospital problems. *               Discharge Disposition:   Discharged to home           Condition on Discharge:   Discharge condition: Stable   Code status on discharge: Full Code           Procedures:   Fiberoptic Endoscopy          Discharge Medications:     Current Discharge Medication List      CONTINUE these medications which have NOT CHANGED    Details   Loratadine (CLARITIN PO)       Triamcinolone Acetonide (NASACORT AQ NA)       ALBUTEROL IN       KETOTIFEN FUMARATE OP       estrogens, conjugated, (PREMARIN) 1.25 MG tablet Take 1.25 mg by mouth      EPINEPHrine (EPIPEN/ADRENACLICK/OR ANY BX GENERIC EQUIV) 0.3 MG/0.3ML injection 2-pack Inject 0.3 mg into the muscle      diphenhydrAMINE (BENADRYL) 25 MG tablet Take 25-50 mg by mouth                   Consultations:   Consultation during this admission received from   ENT             Brief History of Illness:   Corina Alvarenga is a 47 year old female with history of asthma presents to the ED today from Allergy clinic with shortness of breath over the last 8 days.             Hospital Course:   1. Shortness of breath.  Patient states that last week she had an allergic reaction to some sort of pain based scent.  She used her albuterol, but ultimately had to use an EpiPen which did help her, but she had a delayed recurrent allergic reaction, was seen in the emergency room and given a second EpiPen and required a 2 day hospital stay.  She was referred and evaluated today at [facility] for SOB, wheezing, and chest  tightness. She had spirometry, an albuterol neb. Spirometry showed poor effort but no obstructive pattern. She was sent to the ED for further workup.  She did not have hypoxia, but did have SANDOVAL, labs showed a normal normal CMP, CBC, INR, PTT. CXR was also unremarkable.  EKG was without ischemic changes, negative troponin and BNP.  Patient has no known cardiac risk factors she has recently had a CTA chest at outside facility on 12/15, and a normal bedside echo 12/15. ENT evaluated the patient in ED and performed laryngoscopy and noted muscle tension dysphonia, which is likely the cause of her symptoms.  Plan is for ED observation for respiratory monitoring with speech consult in the morning. Patient was seen by speech therapy was provided education material on exercises to help with breathing techniques to help with voice. She was also recommended to follow up with OP voice clinic for further therapy. She slept well last night. No respiratory stress. She feels much better this morning. She is able to swallow without difficulties. She still has no voice, however no breathing difficulties. Patient is stable and she will be discharged to home                  Final Day of Progress before Discharge:       Physical Exam:  Blood pressure 105/89, pulse 78, temperature 98  F (36.7  C), temperature source Oral, resp. rate 20, weight 60.1 kg (132 lb 7.9 oz), SpO2 97 %.    EXAM:  Physical Exam   Constitutional: Pt is oriented to person, place, and time.Pt appears well-developed and well-nourished.   HENT:   Head: Normocephalic and atraumatic.   Eyes: Conjunctivae are normal. Pupils are equal, round, and reactive to light.   Neck: Normal range of motion. Neck supple.   Cardiovascular: Normal rate, regular rhythm, normal heart sounds and intact distal pulses.    Pulmonary/Chest: Effort normal and breath sounds normal. No respiratory distress. Pt has no wheezes. Pt has no rales  Abdominal: Soft. Bowel sounds are normal. Pt  exhibits no distension and no mass. No tenderness. Pt has no rebound and no guarding.   Musculoskeletal: Normal range of motion. Pt exhibits no edema.   Neurological: Pt is alert and oriented to person, place, and time. Normal reflexes.   Skin: Skin is warm and dry. No rash noted.   Psychiatric: Pt has a normal mood and affect. Behavior is normal. Judgment and thought content normal.            Data:  All laboratory data reviewed             Significant Results:   None  Results for orders placed or performed during the hospital encounter of 12/21/17   XR Chest Port 1 View    Narrative    EXAM: XR CHEST PORT 1 VW  12/21/2017 1:16 PM     HISTORY:  sob;        COMPARISON: None    FINDINGS: Single portable upright AP view of chest. Cardiac silhouette  is within normal limits. No pneumothorax. No pleural effusion. No  focal airspace opacity. Upper abdomen is unremarkable.      Impression    IMPRESSION: No acute cardiopulmonary findings. Clear lungs.    I have personally reviewed the examination and initial interpretation  and I agree with the findings.    MARY OCAMPO MD   CBC with platelets differential   Result Value Ref Range    WBC 10.2 4.0 - 11.0 10e9/L    RBC Count 4.82 3.8 - 5.2 10e12/L    Hemoglobin 15.1 11.7 - 15.7 g/dL    Hematocrit 45.7 35.0 - 47.0 %    MCV 95 78 - 100 fl    MCH 31.3 26.5 - 33.0 pg    MCHC 33.0 31.5 - 36.5 g/dL    RDW 13.0 10.0 - 15.0 %    Platelet Count 232 150 - 450 10e9/L    Diff Method Automated Method     % Neutrophils 80.1 %    % Lymphocytes 16.7 %    % Monocytes 2.6 %    % Eosinophils 0.0 %    % Basophils 0.2 %    % Immature Granulocytes 0.4 %    Nucleated RBCs 0 0 /100    Absolute Neutrophil 8.2 1.6 - 8.3 10e9/L    Absolute Lymphocytes 1.7 0.8 - 5.3 10e9/L    Absolute Monocytes 0.3 0.0 - 1.3 10e9/L    Absolute Eosinophils 0.0 0.0 - 0.7 10e9/L    Absolute Basophils 0.0 0.0 - 0.2 10e9/L    Abs Immature Granulocytes 0.0 0 - 0.4 10e9/L    Absolute Nucleated RBC 0.0    INR   Result Value  Ref Range    INR 1.07 0.86 - 1.14   Partial thromboplastin time   Result Value Ref Range    PTT 25 22 - 37 sec   Comprehensive metabolic panel   Result Value Ref Range    Sodium 140 133 - 144 mmol/L    Potassium 3.4 3.4 - 5.3 mmol/L    Chloride 105 94 - 109 mmol/L    Carbon Dioxide 28 20 - 32 mmol/L    Anion Gap 6 3 - 14 mmol/L    Glucose 107 (H) 70 - 99 mg/dL    Urea Nitrogen 10 7 - 30 mg/dL    Creatinine 0.79 0.52 - 1.04 mg/dL    GFR Estimate 78 >60 mL/min/1.7m2    GFR Estimate If Black >90 >60 mL/min/1.7m2    Calcium 9.1 8.5 - 10.1 mg/dL    Bilirubin Total 0.6 0.2 - 1.3 mg/dL    Albumin 4.0 3.4 - 5.0 g/dL    Protein Total 7.8 6.8 - 8.8 g/dL    Alkaline Phosphatase 61 40 - 150 U/L    ALT 26 0 - 50 U/L    AST 13 0 - 45 U/L   Troponin I   Result Value Ref Range    Troponin I ES <0.015 0.000 - 0.045 ug/L   Nt probnp inpatient (BNP)   Result Value Ref Range    N-Terminal Pro BNP Inpatient 35 0 - 450 pg/mL   EKG 12-lead, tracing only   Result Value Ref Range    Interpretation ECG Click View Image link to view waveform and result       Recent Results (from the past 48 hour(s))   XR Chest Port 1 View    Narrative    EXAM: XR CHEST PORT 1 VW  12/21/2017 1:16 PM     HISTORY:  sob;        COMPARISON: None    FINDINGS: Single portable upright AP view of chest. Cardiac silhouette  is within normal limits. No pneumothorax. No pleural effusion. No  focal airspace opacity. Upper abdomen is unremarkable.      Impression    IMPRESSION: No acute cardiopulmonary findings. Clear lungs.    I have personally reviewed the examination and initial interpretation  and I agree with the findings.    MARY OCAMPO MD                Pending Results:   Unresulted Labs Ordered in the Past 30 Days of this Admission     Date and Time Order Name Status Description    12/21/2017 1114 TRYPTASE In process                   Discharge Instructions and Follow-Up:     Discharge Procedure Orders  Speech Pathology Referral     Reason for your hospital stay    Order Comments: Shortness of breath  Dysphonia     Follow Up and recommended labs and tests   Order Comments: Follow up for speech evaluation in 1 week: Referral will be placed.  Per patient request:  [facility] ([place])  [address] [place] [address]  General Contact:  Phone: [phone]     Activity   Order Comments: Your activity upon discharge: activity as tolerated   Order Specific Question Answer Comments   Is discharge order? Yes      When to contact your care team   Order Comments: Contact your care team if:   Your signs and symptoms are the same or worse within 24 hours of treatment.   You have shaking chills or a fever over 102 F.   You have new pain, pressure, or tightness in your chest.   You have a new or worse cough or wheezing, or you cough up blood.   You feel like you cannot get enough air.   You feel confused or dizzy.     Discharge Instructions   Order Comments: You were admitted for shortness of breath secondary to allergic reaction. While in the hospital, you were evaluated by ENT who diagnosed with muscle tension dysphonia. You were also assessed by speech therapy for further guidance. They recommended exercises to relax your vocal cords at home. We also placing a referral for speech evaluation as an outpatient. We have included in your discharge instruction the facility address and phone # to contact to make an appointment.     Full Code     Diet   Order Comments: Follow this diet upon discharge: Regular   Order Specific Question Answer Comments   Is discharge order? Yes             Attestation:  Idania Harris PA-C  12/22/2017

## 2017-12-22 NOTE — PLAN OF CARE
Problem: Patient Care Overview  Goal: Plan of Care/Patient Progress Review  Outpatient/Observation goals to be met before discharge home:  -Diagnostic tests and consults completed and resulted : No.  -Vital signs normal or at patient baseline: Yes  -No shortness of breath or is tolerating transient SOB: Denies SOB, SAT~98% on RA   Denies throat swelling and pain. Dysphonia remaining, speech consult in AM. Tolerating reg diet, no difficulty swallowing.Will continue to monitor.  /89  Pulse 78  Temp 98  F (36.7  C) (Oral)  Resp 20  Wt 60.1 kg (132 lb 7.9 oz)  SpO2 97%  BMI 24.54 kg/m2

## 2017-12-23 LAB — TRYPTASE SERPL-MCNC: 2.4 UG/L

## 2017-12-25 LAB — INTERPRETATION ECG - MUSE: NORMAL

## 2017-12-26 ENCOUNTER — MYC MEDICAL ADVICE (OUTPATIENT)
Dept: ALLERGY | Facility: CLINIC | Age: 47
End: 2017-12-26

## 2017-12-27 NOTE — TELEPHONE ENCOUNTER
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 Was recommended speech therapy.  It explains why albuterol, steroids and epi didn't treat the symptoms.  Above symptoms are not usually allergic, but rather can be caused by an irritation, something I am not sure we could treat. But speech can help to cope with symptoms and even control them.  Allergen immunotherapy could help with rhinitis and asthma, but I am not convinced about her symptoms that she had for the last several months, because as I mentioned it above, it's not necessarily allergy what is causing it.  "

## 2017-12-27 NOTE — PROGRESS NOTES
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 Was recommended speech therapy.    Essentially normal CMP. Normal tryptase level and CBC with diff.  "

## 2017-12-28 NOTE — TELEPHONE ENCOUNTER
"Note was sent to patient.      \"  12/27/2017 10:55 AM Y Sarai Wren, RN Patient Medical Advice Request  RE: A follow-up question for a visit within the past seven days   \"    Patient read [employer] message.  Closing encounter.  Sarai Wren, RN      "

## 2018-01-29 ENCOUNTER — HEALTH MAINTENANCE LETTER (OUTPATIENT)
Age: 48
End: 2018-01-29

## 2018-02-28 ENCOUNTER — MYC MEDICAL ADVICE (OUTPATIENT)
Dept: ALLERGY | Facility: CLINIC | Age: 48
End: 2018-02-28

## 2018-02-28 NOTE — TELEPHONE ENCOUNTER
I am not sure what to tell you.  It is either you have severe allergies, or your mast cells could be involved.  However, your tryptase level was within normal limits.  You can ask your allergist if he suspects a mast cell activation syndrome and if workup is necessary.  I personally do not treat mast cell activation syndrome.  A part of the workup may involve bone marrow biopsy.  You may want to consider evaluation at [facility].  Jan Mesa

## 2020-03-11 ENCOUNTER — HEALTH MAINTENANCE LETTER (OUTPATIENT)
Age: 50
End: 2020-03-11

## 2021-01-03 ENCOUNTER — HEALTH MAINTENANCE LETTER (OUTPATIENT)
Age: 51
End: 2021-01-03

## 2021-03-06 ENCOUNTER — HEALTH MAINTENANCE LETTER (OUTPATIENT)
Age: 51
End: 2021-03-06

## 2021-04-25 ENCOUNTER — HEALTH MAINTENANCE LETTER (OUTPATIENT)
Age: 51
End: 2021-04-25

## 2021-08-24 NOTE — ED NOTES
Presents for shortness of breath related to allergic reaction from ~1 week ago. Pt states she had allergic reaction to pine or cedar last week, was treated 2x in one day. Reports no improvement in breathing over the past week. [facility] referred her here    Maternal Grandmother         colon    Heart Disease Maternal Grandfather     No Known Problems Father     No Known Problems Brother     No Known Problems Paternal Grandfather     No Known Problems Paternal Grandmother        Social History:   Social History     Socioeconomic History    Marital status:      Spouse name: Noa Torres Number of children: 3    Years of education: 16    Highest education level: Bachelor's degree (e.g., BA, AB, BS)   Occupational History    Occupation: [employer]     Comment: brother is Dr. 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, entered treatment; always leland    Drug use: No    Sexual activity: Yes     Partners: Male   Other Topics Concern    Not on file   Social History Narrative    Parents, brother spouses        Vivienne Oshea 40        3 children    17 y/o Shayneheath Mccormick 15 y/o son Gildardo Field    5 y.o [employer] 4th Grade [employer]         Social Determinants of Health     Financial Resource Strain:     Difficulty of Paying Living Expenses:    Food Insecurity:     Worried About Running Out of Food in the Last Year:     [address] in the Last Year:    Transportation Needs:     Lack of Transportation (Medical):      Lack of Transportation (Non-Medical):    Physical Activity: Unknown    Days of Exercise per Week: 0 days    Minutes of Exercise per Session: Not asked   Stress:     Feeling of Stress :    Social Connections:     Frequency of Communication with Friends and Family:     Frequency of Social Gatherings with Friends and Family:     Attends Religion Services:     Active Member of Clubs or Organizations:     Attends Club or Organization Meetings:     Marital Status:    Intimate Partner Violence:     Fear of Current or Ex-Partner:     Emotionally Abused:     Physically Abused:     Sexually Abused:        Health Maintenance Completed:  Health Maintenance   Topic Date Due    Hepatitis C screen  Never done    Pneumococcal 0-64 years Vaccine (1 of 2 - PPSV23) Never done    DTaP/Tdap/Td vaccine (1 - Tdap) Never done    Cervical cancer screen  06/21/2020    Flu vaccine (1) 09/01/2021    Lipid screen  03/03/2026    COVID-19 Vaccine  Completed    Hepatitis A vaccine  Aged Out    Hepatitis B vaccine  Aged Out    Hib vaccine  Aged Out    Meningococcal (ACWY) vaccine  Aged Out    Varicella vaccine  Discontinued    HIV screen  Discontinued          Immunization History   Administered Date(s) Administered    COVID-19, Moderna, PF, 100mcg/0.5mL 01/20/2021, 02/19/2021         Review of Systems:  Review of Systems   Constitutional: Positive for unexpected weight change (weight gain). Negative for chills, fatigue and fever. HENT: Negative for congestion, rhinorrhea and sore throat. Eyes: Negative for pain, discharge and visual disturbance. Respiratory: Negative for shortness of breath and wheezing. Cardiovascular: Negative for chest pain and leg swelling. Gastrointestinal: Negative for abdominal pain, blood in stool, constipation and diarrhea. Endocrine: Negative for polyuria. Genitourinary: Negative for dysuria and flank pain. Musculoskeletal: Negative for arthralgias, back pain and neck pain. Skin: Negative for color change, rash and wound. Allergic/Immunologic: Negative for environmental allergies, food allergies and immunocompromised state. Neurological: Negative for dizziness, speech difficulty, weakness, light-headedness and headaches. Hematological: Negative for adenopathy. Does not bruise/bleed easily. Psychiatric/Behavioral: Negative for dysphoric mood and sleep disturbance. The patient is not nervous/anxious. Physical Exam:   Vitals:    08/24/21 1045   BP: 122/86   Pulse: 84   Temp: 98.1 °F (36.7 °C)   TempSrc: Temporal   SpO2: 98%   Weight: 153 lb (69.4 kg)   Height: 5' 5\" (1.651 m)     Body mass index is 25.46 kg/m². Wt Readings from Last 3 Encounters:   08/24/21 153 lb (69.4 kg)   06/29/21 146 lb 3.2 oz (66.3 kg)   03/01/21 140 lb (63.5 kg)       BP Readings from Last 3 Encounters:   08/24/21 122/86   06/29/21 128/86   03/04/21 110/75       Physical Exam  Constitutional:       General: She is not in acute distress. Appearance: She is well-developed. HENT:      Head: Normocephalic and atraumatic. Right Ear: Tympanic membrane normal.      Left Ear: Tympanic membrane normal.      Nose: Nose normal. No rhinorrhea. Mouth/Throat:      Pharynx: Uvula midline. Eyes:      Pupils: Pupils are equal, round, and reactive to light. Neck:      Trachea: No tracheal deviation. Cardiovascular:      Rate and Rhythm: Normal rate and regular rhythm. Heart sounds: Normal heart sounds. No murmur heard. No friction rub. No gallop. Pulmonary:      Effort: Pulmonary effort is normal. No respiratory distress. Breath sounds: Normal breath sounds. No wheezing or rales. Abdominal:      General: Bowel sounds are normal. There is no distension. Palpations: Abdomen is soft. Tenderness: There is no abdominal tenderness. There is no rebound. Musculoskeletal:         General: No tenderness. Normal range of motion. Lymphadenopathy:      Cervical: No cervical adenopathy. Skin:     General: Skin is warm and dry. Findings: No erythema or rash. Neurological:      Mental Status: She is alert and oriented to person, place, and time. Cranial Nerves: No cranial nerve deficit. Psychiatric:         Mood and Affect: Mood is anxious and depressed.          Speech: Speech normal.         Thought Content: Thought content does not include homicidal or suicidal ideation. Judgment: Judgment is impulsive.             Lab Review:  Lab Results   Component Value Date     03/01/2021    K 3.8 03/01/2021     03/01/2021    CO2 26 03/01/2021    BUN 11 03/01/2021    CREATININE 0.8 03/01/2021    GLUCOSE 98 03/01/2021    CALCIUM 9.9 03/01/2021    PROT 7.9 03/01/2021    LABALBU 4.8 03/01/2021    BILITOT <0.2 03/01/2021    ALKPHOS 62 03/01/2021    AST 18 03/01/2021    ALT 13 03/01/2021    LABGLOM >60 03/01/2021    GFRAA >60 03/01/2021    AGRATIO 1.5 03/01/2021    GLOB 3.1 03/01/2021       Lab Results   Component Value Date    WBC 9.3 03/01/2021    HGB 14.0 03/01/2021    HCT 41.0 03/01/2021    .3 (H) 03/01/2021     03/01/2021     Lab Results   Component Value Date    CHOL 234 (H) 03/03/2021    CHOL 265 (H) 08/20/2020     Lab Results   Component Value Date    TRIG 83 03/03/2021    TRIG 47 08/20/2020     Lab Results   Component Value Date    HDL 70 (H) 03/03/2021    HDL 85 (H) 08/20/2020     Lab Results   Component Value Date    LDLCALC 147 (H) 03/03/2021    LDLCALC 171 (H) 08/20/2020     Lab Results   Component Value Date    LABVLDL 17 03/03/2021    LABVLDL 9 08/20/2020     No results found for: CHOLHDLRATIO  The 10-year ASCVD risk score (Alize Madrigal et al., 2013) is: 0.5%    Values used to calculate the score:      Age: 37 years      Sex: Female      Is Non- : No      Diabetic: No      Tobacco smoker: No      Systolic Blood Pressure: 704 mmHg      Is BP treated: No      HDL Cholesterol: 70 mg/dL      Total Cholesterol: 234 mg/dL    Lab Results   Component Value Date    LABA1C 5.1 03/03/2021     Lab Results   Component Value Date    EAG 99.7 03/03/2021       Assessment/Plan:  Adolfo Wong is 36 y/o female who was seen today for medication check, medication problem ofweight gain, depression, anxiety, PTSD, alcohol problem and health

## 2021-10-10 ENCOUNTER — HEALTH MAINTENANCE LETTER (OUTPATIENT)
Age: 51
End: 2021-10-10

## 2022-03-26 ENCOUNTER — HEALTH MAINTENANCE LETTER (OUTPATIENT)
Age: 52
End: 2022-03-26

## 2022-05-21 ENCOUNTER — HEALTH MAINTENANCE LETTER (OUTPATIENT)
Age: 52
End: 2022-05-21

## 2022-09-17 ENCOUNTER — HEALTH MAINTENANCE LETTER (OUTPATIENT)
Age: 52
End: 2022-09-17

## 2023-05-06 ENCOUNTER — HEALTH MAINTENANCE LETTER (OUTPATIENT)
Age: 53
End: 2023-05-06

## 2023-06-04 ENCOUNTER — HEALTH MAINTENANCE LETTER (OUTPATIENT)
Age: 53
End: 2023-06-04

## 2025-01-06 NOTE — PLAN OF CARE
Problem: Patient Care Overview  Goal: Individualization & Mutuality  Outcome: Improving  Alert and oriented x 4.Vital signs stable with bradycardia. Patient stated she usually runs low 50 beats/min. Denies shortness of breath.On room air. Denies pain. Fair oral intake. Ambulates in room independently. Plan:Continue care. Discharge to home this afternoon.       Pt will have son drop form off to office.     No additional questions or concerns.